# Patient Record
Sex: MALE | Race: WHITE | NOT HISPANIC OR LATINO | Employment: OTHER | URBAN - METROPOLITAN AREA
[De-identification: names, ages, dates, MRNs, and addresses within clinical notes are randomized per-mention and may not be internally consistent; named-entity substitution may affect disease eponyms.]

---

## 2017-03-28 ENCOUNTER — TELEPHONE (OUTPATIENT)
Dept: INTERNAL MEDICINE | Facility: CLINIC | Age: 52
End: 2017-03-28

## 2017-03-28 DIAGNOSIS — I82.90 CLOT: Primary | ICD-10-CM

## 2017-03-28 NOTE — TELEPHONE ENCOUNTER
elio follow up with me  Vascular medicine appt placed   Assist in scheduling both   Have him bring all his records for review

## 2017-03-28 NOTE — TELEPHONE ENCOUNTER
Spoke w pt; states that he had ultrasound of the leg this morning at an outside facility. States that the doctor did diagnose him with a blood clot. Pt was prescribed eliquis for 6 months. Pt wants to know what else should he do ?      Please advise.

## 2017-03-28 NOTE — TELEPHONE ENCOUNTER
Spoke w pt; schedule appointment for vascular medicine and will follow up w Dr. Doll after. Pt verbalized understanding.

## 2017-03-28 NOTE — TELEPHONE ENCOUNTER
----- Message from Viviana Pat sent at 3/28/2017 12:04 PM CDT -----  Contact: self  Patient states has blood clot in leg need appointment for tomorrow   Please call pt at [phone]

## 2017-03-29 ENCOUNTER — INITIAL CONSULT (OUTPATIENT)
Dept: CARDIOLOGY | Facility: CLINIC | Age: 52
End: 2017-03-29
Payer: COMMERCIAL

## 2017-03-29 VITALS
HEART RATE: 62 BPM | WEIGHT: 205.69 LBS | HEIGHT: 72 IN | SYSTOLIC BLOOD PRESSURE: 122 MMHG | BODY MASS INDEX: 27.86 KG/M2 | DIASTOLIC BLOOD PRESSURE: 80 MMHG

## 2017-03-29 DIAGNOSIS — I82.4Y9 ACUTE DEEP VEIN THROMBOSIS (DVT) OF PROXIMAL VEIN OF LOWER EXTREMITY, UNSPECIFIED LATERALITY: Primary | ICD-10-CM

## 2017-03-29 PROCEDURE — 1160F RVW MEDS BY RX/DR IN RCRD: CPT | Mod: S$GLB,,, | Performed by: INTERNAL MEDICINE

## 2017-03-29 PROCEDURE — 99999 PR PBB SHADOW E&M-EST. PATIENT-LVL III: CPT | Mod: PBBFAC,,, | Performed by: INTERNAL MEDICINE

## 2017-03-29 PROCEDURE — 99203 OFFICE O/P NEW LOW 30 MIN: CPT | Mod: S$GLB,,, | Performed by: INTERNAL MEDICINE

## 2017-03-29 NOTE — LETTER
March 29, 2017      Opal Doll MD  [address]  [facility] [address]           [facility]  [address]  [facility] [address]  Phone: [phone]          Patient: Marcio Becerra   MR Number: [id number]   YOB: 1965   Date of Visit: 3/29/2017       Dear Dr. Opal Doll:    Thank you for referring Marcio Becerra to me for evaluation. Attached you will find relevant portions of my assessment and plan of care.    If you have questions, please do not hesitate to call me. I look forward to following Marcio Becerra along with you.    Sincerely,    Sayavtar Dailey MD    Enclosure  CC:  No Recipients    If you would like to receive this communication electronically, please contact [email or web address] or [phone] to request more information on [employer] [facility] access.    For providers and/or their staff who would like to refer a patient to [facility], please contact us through our one-stop-shop provider referral line, [facility], at [phone].    If you feel you have received this communication in error or would no longer like to receive these types of communications, please e-mail [email or web address]

## 2017-03-29 NOTE — MR AVS SNAPSHOT
[facility]  [address]  [facility] [address]  Phone: [phone]                  Marcio Becerra   3/29/2017 8:00 AM   Initial consult    Description:  Male : 1965   Provider:  Aislinn Dailey MD   Department:  [facility]           Reason for Visit     Deep Vein Thrombosis           Diagnoses this Visit        Comments    Acute deep vein thrombosis (DVT) of proximal vein of lower extremity, unspecified laterality    -  Primary            To Do List           Goals (5 Years of Data)     None      Follow-Up and Disposition     Call patient with results Return in about 3 months (around 2017).       These Medications        Disp Refills Start End    apixaban 5 mg Tab 60 tablet 6 3/29/2017     Take 1 tablet (5 mg total) by mouth 2 (two) times daily. - Oral    Pharmacy: [facility]/pharmacy #[id number] - [place] - [address]  #: [phone]         [facility]     [facility] Nurse Care Line -  Assistance  Registered nurses in the [facility] provide clinical advisement, health education, appointment booking, and other advisory services.  Call for this free service at [phone].             Medications           Message regarding Medications     Verify the changes and/or additions to your medication regime listed below are the same as discussed with your clinician today.  If any of these changes or additions are incorrect, please notify your healthcare provider.        START taking these NEW medications        Refills    apixaban 5 mg Tab 6    Sig: Take 1 tablet (5 mg total) by mouth 2 (two) times daily.    Class: Normal    Route: Oral           Verify that the below list of medications is an accurate representation of the medications you are currently taking.  If none reported, the list may be blank. If incorrect, please contact your healthcare provider. Carry this list with you in case of emergency.           Current  Medications     apixaban 5 mg Tab Take 5 mg by mouth 2 (two) times daily.    apixaban 5 mg Tab Take 1 tablet (5 mg total) by mouth 2 (two) times daily.           Clinical Reference Information           Your Vitals Were     BP Pulse Height Weight BMI    122/80 (BP Location: Left arm, Patient Position: Sitting, BP Method: Manual) 62 6' (1.829 m) 93.3 kg (205 lb 11 oz) 27.9 kg/m2      Blood Pressure          Most Recent Value    BP  122/80      Allergies as of 3/29/2017     No Known Allergies      Immunizations Administered on Date of Encounter - 3/29/2017     None      Orders Placed During Today's Visit     Future Labs/Procedures Expected by Expires    Cardiology Lab US Lower Extremity Veins Right  6/29/2017 3/29/2018      [facility] Sign-Up     Activating your [facility] account is as easy as 1-2-3!     1) Visit [email or web address], select Sign Up Now, enter this activation code and your date of birth, then select Next.  [email or web address]  Expires: 5/13/2017  8:42 AM      2) Create a username and password to use when you visit [facility] in the future and select a security question in case you lose your password and select Next.    3) Enter your e-mail address and click Sign Up!    Additional Information  If you have questions, please e-mail [email or web address] or call [phone] to talk to our [facility] staff. Remember, [facility] is NOT to be used for urgent needs. For medical emergencies, dial 911.         Smoking Cessation     If you would like to quit smoking:   You may be eligible for free services if you are a [place] resident and started smoking cigarettes before September 1, 1988.  Call the Smoking Cessation Trust (SCT) toll free at (752) 363-8830 or (728) 766-5939.   Call 1-800-QUIT-NOW if you do not meet the above criteria.            Language Assistance Services     ATTENTION: Language assistance services are available, free of charge. Please call [phone].      ATENCIÓN: hiram Hughes  taylor disposición servicios gratuitos de asistencia lingüística. Jacqueline al [phone].     GUZMAN Ý: N?u b?n nói Ti?ng Vi?t, có các d?ch v? h? tr? ngôn ng? mi?n phí dành cho b?n. G?i s? [phone].         [facility] complies with applicable Federal civil rights laws and does not discriminate on the basis of race, color, national origin, age, disability, or sex.

## 2017-03-29 NOTE — PROGRESS NOTES
Subjective:    Patient ID:  Marcio Becerra is a 52 y.o. 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 Presents for evaluation of right leg pain and tenderness that' started last week. He was riding his bike for almost 1 hour, he developed right calf spasm, next day progressed to ankle swelling. Venous doppler that was done at the [facility] vein clinic showed evidence of acute popliteal DVT. The patient was prescribed apixaban yesterday, but he didn't take it. No chest pain or SOB.      Screening tests are negative  unprovoked right popliteal DVT  No family history of DVT.  Not smoker.      2012.  Report Summary:  Impression:   Left Leg: Color flow evaluation of the lower extremity demonstrates fully compressible and patent veins. There is no evidence of venous thrombosis in the deep or superficial veins. Significant reflux is noted in the greater saphenous vein.  Past Medical History:   Diagnosis Date    DJD (degenerative joint disease) of cervical spine     hx of     Fatty liver     Fever blister        indicated that the status of his mother is unknown. He indicated that the status of his father is unknown. He indicated that the status of his neg hx is unknown.     Social History     Social History    Marital status:      Spouse name: N/A    Number of children: N/A    Years of education: N/A     Occupational History    Not on file.     Social History Main Topics    Smoking status: Current Every Day Smoker     Types: Cigars    Smokeless tobacco: Current User    Alcohol use Yes      Comment: 3-4 beers daily     Drug use: No    Sexual activity: Not on file     Other Topics Concern    Not on file     Social History Narrative       Current Outpatient Prescriptions   Medication Sig    apixaban 5 mg Tab Take 5 mg by mouth 2 (two) times daily.    apixaban 5 mg Tab Take 1 tablet (5 mg total) by mouth 2  (two) times daily.     No current facility-administered medications for this visit.        CMP  Sodium   Date Value Ref Range Status   10/14/2016 142 136 - 145 mmol/L Final     Potassium   Date Value Ref Range Status   10/14/2016 4.3 3.5 - 5.1 mmol/L Final     Chloride   Date Value Ref Range Status   10/14/2016 106 95 - 110 mmol/L Final     CO2   Date Value Ref Range Status   10/14/2016 30 (H) 23 - 29 mmol/L Final     Glucose   Date Value Ref Range Status   10/14/2016 89 70 - 110 mg/dL Final     BUN, Bld   Date Value Ref Range Status   10/14/2016 9 6 - 20 mg/dL Final     Creatinine   Date Value Ref Range Status   10/14/2016 0.9 0.5 - 1.4 mg/dL Final     Calcium   Date Value Ref Range Status   10/14/2016 9.1 8.7 - 10.5 mg/dL Final     Total Protein   Date Value Ref Range Status   11/23/2016 7.3 6.0 - 8.4 g/dL Final     Albumin   Date Value Ref Range Status   11/23/2016 3.7 3.5 - 5.2 g/dL Final     Total Bilirubin   Date Value Ref Range Status   11/23/2016 0.8 0.1 - 1.0 mg/dL Final     Comment:     For infants and newborns, interpretation of results should be based  on gestational age, weight and in agreement with clinical  observations.  Premature Infant recommended reference ranges:  Up to 24 hours.............<8.0 mg/dL  Up to 48 hours............<12.0 mg/dL  3-5 days..................<15.0 mg/dL  6-29 days.................<15.0 mg/dL       Alkaline Phosphatase   Date Value Ref Range Status   11/23/2016 40 (L) 55 - 135 U/L Final     AST   Date Value Ref Range Status   11/23/2016 26 10 - 40 U/L Final     ALT   Date Value Ref Range Status   11/23/2016 29 10 - 44 U/L Final     Anion Gap   Date Value Ref Range Status   10/14/2016 6 (L) 8 - 16 mmol/L Final     eGFR if    Date Value Ref Range Status   10/14/2016 >60.0 >60 mL/min/1.73 m^2 Final     eGFR if non    Date Value Ref Range Status   10/14/2016 >60.0 >60 mL/min/1.73 m^2 Final     Comment:     Calculation used to obtain the  estimated glomerular filtration  rate (eGFR) is the CKD-EPI equation. Since race is unknown   in our information system, the eGFR values for   -American and Non--American patients are given   for each creatinine result.         Lab Results   Component Value Date    WBC 4.05 10/14/2016    HGB 16.7 10/14/2016    HCT 46.8 10/14/2016    MCV 95 10/14/2016     10/14/2016       Review of Systems   Constitution: Negative for decreased appetite, fever and weight gain.   HENT: Negative.    Cardiovascular: Positive for leg swelling. Negative for chest pain, claudication and cyanosis.   Respiratory: Negative for cough, shortness of breath and wheezing.    Skin: Negative for color change, dry skin, itching, rash and suspicious lesions.   Musculoskeletal: Positive for myalgias. Negative for arthritis, back pain, joint swelling and muscle weakness.   Gastrointestinal: Negative.    Genitourinary: Negative.    Neurological: Negative.  Negative for loss of balance, numbness and paresthesias.        Objective:   /80 (BP Location: Left arm, Patient Position: Sitting, BP Method: Manual)  Pulse 62  Ht 6' (1.829 m)  Wt 93.3 kg (205 lb 11 oz)  BMI 27.9 kg/m2  Physical Exam   Constitutional: He is oriented to person, place, and time. He appears well-developed and well-nourished. No distress.   HENT:   Head: Normocephalic and atraumatic.   Eyes: Conjunctivae and EOM are normal. Pupils are equal, round, and reactive to light.   Neck: Normal range of motion. Neck supple. No JVD present.   Cardiovascular: Normal rate, regular rhythm, normal heart sounds and intact distal pulses.  Exam reveals no gallop and no friction rub.    No murmur heard.  Pulmonary/Chest: Effort normal and breath sounds normal. No respiratory distress. He has no wheezes.   Musculoskeletal: Normal range of motion. He exhibits tenderness. He exhibits no edema.   Neurological: He is alert and oriented to person, place, and time.   Skin: Skin is  warm. No rash noted. He is not diaphoretic. No erythema. No pallor.           Assessment:       1. Acute deep vein thrombosis (DVT) of proximal vein of lower extremity, unspecified laterality  apixaban 5 mg Tab    Cardiology Lab US Lower Extremity Veins Right      A biker who had unprovoked right popliteal vein DVT.  Plan:       Diagnoses and all orders for this visit:    1. Continue Apixaban for 3- 6 months.  2. Venous doppler right in 3 months  3. D-dimer and clotting factors work up  In 3-6 months.  4. Compression stocking.  5. Follow up in 3 months.  6. Will get his Venous doppler report from [facility].    Aislinn Dailey

## 2017-03-31 ENCOUNTER — TELEPHONE (OUTPATIENT)
Dept: CARDIOLOGY | Facility: CLINIC | Age: 52
End: 2017-03-31

## 2017-03-31 NOTE — TELEPHONE ENCOUNTER
Per Dr julien spoke with patient in regards to medication advice .    Patient verbalized understanding.

## 2017-03-31 NOTE — TELEPHONE ENCOUNTER
----- Message from Fany Gonzalez sent at 3/30/2017  3:38 PM CDT -----  Contact: self   Pt has requested a call to discuss his current medication. Pt call back number is [phone].        Thanks

## 2017-04-24 ENCOUNTER — OFFICE VISIT (OUTPATIENT)
Dept: INTERNAL MEDICINE | Facility: CLINIC | Age: 52
End: 2017-04-24
Payer: COMMERCIAL

## 2017-04-24 VITALS
OXYGEN SATURATION: 94 % | SYSTOLIC BLOOD PRESSURE: 122 MMHG | DIASTOLIC BLOOD PRESSURE: 78 MMHG | TEMPERATURE: 99 F | HEART RATE: 73 BPM | HEIGHT: 72 IN | BODY MASS INDEX: 26.76 KG/M2 | RESPIRATION RATE: 16 BRPM | WEIGHT: 197.56 LBS

## 2017-04-24 DIAGNOSIS — J40 BRONCHITIS: Primary | ICD-10-CM

## 2017-04-24 PROCEDURE — 99999 PR PBB SHADOW E&M-EST. PATIENT-LVL IV: CPT | Mod: PBBFAC,,, | Performed by: NURSE PRACTITIONER

## 2017-04-24 PROCEDURE — 1160F RVW MEDS BY RX/DR IN RCRD: CPT | Mod: S$GLB,,, | Performed by: NURSE PRACTITIONER

## 2017-04-24 PROCEDURE — 99213 OFFICE O/P EST LOW 20 MIN: CPT | Mod: S$GLB,,, | Performed by: NURSE PRACTITIONER

## 2017-04-24 RX ORDER — FLUTICASONE PROPIONATE 50 MCG
1 SPRAY, SUSPENSION (ML) NASAL DAILY
Qty: 16 G | Refills: 0
Start: 2017-04-24

## 2017-04-24 RX ORDER — ALBUTEROL SULFATE 90 UG/1
2 AEROSOL, METERED RESPIRATORY (INHALATION) EVERY 4 HOURS PRN
Qty: 1 INHALER | Refills: 0 | Status: SHIPPED | OUTPATIENT
Start: 2017-04-24

## 2017-04-24 RX ORDER — BENZONATATE 200 MG/1
200 CAPSULE ORAL 3 TIMES DAILY PRN
Qty: 30 CAPSULE | Refills: 0 | Status: SHIPPED | OUTPATIENT
Start: 2017-04-24 | End: 2017-05-04

## 2017-04-24 NOTE — MR AVS SNAPSHOT
[facility] - Internal Medicine  [address]  [facility] [address]  Phone: [phone]  Fax: [phone]                  Marcio Becerra   2017 3:30 PM   Office Visit    Description:  Male : 1965   Provider:  Jayshree Cadena NP   Department:  [facility]           Reason for Visit     Cough     Shortness of Breath           Diagnoses this Visit        Comments    Bronchitis    -  Primary            To Do List           Future Appointments        Provider Department Dept Phone    2017 10:00 AM VASCULAR, CARDIOLOGY [facility] - Vascular Cardiology [phone]      Goals (5 Years of Data)     None       These Medications        Disp Refills Start End    fluticasone (FLONASE) 50 mcg/actuation nasal spray 16 g 0 2017     1 spray by Each Nare route once daily. - Each Nare    Pharmacy: [facility]/pharmacy #[id number] - [place] - [address]. Ph #: [phone]       albuterol 90 mcg/actuation inhaler 1 Inhaler 0 2017     Inhale 2 puffs into the lungs every 4 (four) hours as needed for Wheezing. Or bronchospasm - Inhalation    Pharmacy: [facility]/pharmacy #[id number] - [place] - [address] Ph #: [phone]       benzonatate (TESSALON) 200 MG capsule 30 capsule 0 2017    Take 1 capsule (200 mg total) by mouth 3 (three) times daily as needed for Cough. - Oral    Pharmacy: [facility]/pharmacy #[id number] - [place] - [address] Ph #: [phone]         [facility] On Call     [facility] On [facility] Nurse Care Line -  Assistance  Unless otherwise directed by your provider, please contact [facility], our nurse care line that is available for  assistance.     Registered nurses in the [facility] provide: appointment scheduling, clinical advisement, health education, and other advisory services.  Call: [phone] (toll free)               Medications           Message regarding Medications     Verify the changes and/or additions to your  medication regime listed below are the same as discussed with your clinician today.  If any of these changes or additions are incorrect, please notify your healthcare provider.        START taking these NEW medications        Refills    fluticasone (FLONASE) 50 mcg/actuation nasal spray 0    Si spray by Each Nare route once daily.    Class: No Print    Route: Each Nare    albuterol 90 mcg/actuation inhaler 0    Sig: Inhale 2 puffs into the lungs every 4 (four) hours as needed for Wheezing. Or bronchospasm    Class: Normal    Route: Inhalation    benzonatate (TESSALON) 200 MG capsule 0    Sig: Take 1 capsule (200 mg total) by mouth 3 (three) times daily as needed for Cough.    Class: Normal    Route: Oral           Verify that the below list of medications is an accurate representation of the medications you are currently taking.  If none reported, the list may be blank. If incorrect, please contact your healthcare provider. Carry this list with you in case of emergency.           Current Medications     apixaban 5 mg Tab Take 1 tablet (5 mg total) by mouth 2 (two) times daily.    albuterol 90 mcg/actuation inhaler Inhale 2 puffs into the lungs every 4 (four) hours as needed for Wheezing. Or bronchospasm    apixaban 5 mg Tab Take 5 mg by mouth 2 (two) times daily.    benzonatate (TESSALON) 200 MG capsule Take 1 capsule (200 mg total) by mouth 3 (three) times daily as needed for Cough.    fluticasone (FLONASE) 50 mcg/actuation nasal spray 1 spray by Each Nare route once daily.           Clinical Reference Information           Your Vitals Were     BP Pulse Temp Resp Height Weight    122/78 (BP Location: Left arm, Patient Position: Sitting) 73 98.7 °F (37.1 °C) (Oral) 16 6' (1.829 m) 89.6 kg (197 lb 8.5 oz)    SpO2 BMI             94% 26.79 kg/m2         Blood Pressure          Most Recent Value    BP  122/78      Allergies as of 2017     No Known Allergies      Immunizations Administered on Date of Encounter  - 4/24/2017     None      [facility] Sign-Up     Activating your [facility] account is as easy as 1-2-3!     1) Visit [email or web address], select Sign Up Now, enter this activation code and your date of birth, then select Next.  [email or web address]  Expires: 5/13/2017  8:42 AM      2) Create a username and password to use when you visit [facility] in the future and select a security question in case you lose your password and select Next.    3) Enter your e-mail address and click Sign Up!    Additional Information  If you have questions, please e-mail [email or web address] or call [phone] to talk to our [facility] staff. Remember, [facility] is NOT to be used for urgent needs. For medical emergencies, dial 911.         Instructions    - Warm, salt water gargles three times daily as needed.   - Delsym 12 cough suppressant       Smoking Cessation     If you would like to quit smoking:   You may be eligible for free services if you are a [place] resident and started smoking cigarettes before September 1, 1988.  Call the Smoking Cessation Trust ([facility]) toll free at (307) 800-0415 or (421) 540-4272.   Call 5-800-QUIT-NOW if you do not meet the above criteria.   Contact us via email: [email or web address]   View our website for more information: [email or web address]        Language Assistance Services     ATTENTION: Language assistance services are available, free of charge. Please call [phone].      ATENCIÓN: Si habla español, tiene a taylor disposición servicios gratuitos de asistencia lingüística. Llame al [phone].     CHÚ Ý: N?u b?n nói Ti?ng Vi?t, có các d?ch v? h? tr? ngôn ng? mi?n phí dành cho b?n. G?i s? [phone].         [facility] complies with applicable Federal civil rights laws and does not discriminate on the basis of race, color, national origin, age, disability, or sex.

## 2017-04-24 NOTE — PROGRESS NOTES
"Subjective:       Patient ID: Marcio Becerra is a 52 y.o. male.    Chief Complaint: Cough and Shortness of Breath  Mr Becerra presents today for a cough.  He has had it since early March.  He went to a non[facility] urgent care and got a shot, likely steroid, and felt better for about 2 weeks.  He then began coughing again and it has not improved.   Cough   This is a new problem. The current episode started more than 1 month ago. The problem has been waxing and waning. The problem occurs every few minutes. The cough is productive of sputum. Associated symptoms include headaches, postnasal drip, rhinorrhea and shortness of breath. Pertinent negatives include no chest pain, chills, ear congestion, ear pain, fever, heartburn, hemoptysis, myalgias, nasal congestion, rash, sore throat, sweats, weight loss or wheezing. Nothing aggravates the symptoms. Treatments tried: "shot" The treatment provided moderate relief.     Review of Systems   Constitutional: Positive for fatigue. Negative for chills, diaphoresis, fever and weight loss.   HENT: Positive for postnasal drip and rhinorrhea. Negative for ear pain, facial swelling, sore throat and trouble swallowing.    Eyes: Negative for visual disturbance.   Respiratory: Positive for cough and shortness of breath. Negative for hemoptysis and wheezing.    Cardiovascular: Negative for chest pain.   Gastrointestinal: Negative for diarrhea, heartburn, nausea and vomiting.   Genitourinary: Negative for dysuria.   Musculoskeletal: Negative for myalgias.   Skin: Negative for rash.   Neurological: Positive for headaches.   Psychiatric/Behavioral: Negative for confusion.       Objective:      Physical Exam   Constitutional: He is oriented to person, place, and time. He appears well-developed and well-nourished. No distress.   HENT:   Head: Normocephalic and atraumatic.   Right Ear: Tympanic membrane and ear canal normal.   Left Ear: Tympanic membrane and ear canal normal.   Nose: No mucosal " edema, rhinorrhea or sinus tenderness. Right sinus exhibits no maxillary sinus tenderness and no frontal sinus tenderness. Left sinus exhibits no maxillary sinus tenderness and no frontal sinus tenderness.   Mouth/Throat: Uvula swelling present. Oropharyngeal exudate, posterior oropharyngeal edema and posterior oropharyngeal erythema present. No tonsillar abscesses. Tonsils are 0 on the right. Tonsils are 0 on the left. No tonsillar exudate.   Eyes: No scleral icterus.   Neck: Normal range of motion. Neck supple.   Cardiovascular: Normal rate, regular rhythm and normal heart sounds.  Exam reveals no gallop and no friction rub.    No murmur heard.  Pulmonary/Chest: Effort normal and breath sounds normal. No respiratory distress. He has no wheezes. He has no rales.   Abdominal: Bowel sounds are normal.   Musculoskeletal: Normal range of motion.   Lymphadenopathy:     He has no cervical adenopathy.   Neurological: He is oriented to person, place, and time.   Skin: Skin is warm and dry. He is not diaphoretic.   Psychiatric: He has a normal mood and affect. His behavior is normal.   Vitals reviewed.      Assessment:       1. Bronchitis        Plan:   1. Bronchitis  - fluticasone (FLONASE) 50 mcg/actuation nasal spray; 1 spray by Each Nare route once daily.  Dispense: 16 g; Refill: 0  - albuterol 90 mcg/actuation inhaler; Inhale 2 puffs into the lungs every 4 (four) hours as needed for Wheezing. Or bronchospasm  Dispense: 1 Inhaler; Refill: 0  - benzonatate (TESSALON) 200 MG capsule; Take 1 capsule (200 mg total) by mouth 3 (three) times daily as needed for Cough.  Dispense: 30 capsule; Refill: 0      Pt has been given instructions populated from [employer] database and has verbalized understanding of the after visit summary and information contained wherein.    Follow up with a primary care provider. May go to ER for acute shortness of breath, lightheadedness, fever, or any other emergent complaints or changes in  condition.

## 2017-05-03 ENCOUNTER — HOSPITAL ENCOUNTER (OUTPATIENT)
Dept: RADIOLOGY | Facility: HOSPITAL | Age: 52
Discharge: HOME | End: 2017-05-03
Attending: INTERNAL MEDICINE
Payer: COMMERCIAL

## 2017-05-03 ENCOUNTER — NURSE TRIAGE (OUTPATIENT)
Dept: ADMINISTRATIVE | Facility: CLINIC | Age: 52
End: 2017-05-03

## 2017-05-03 ENCOUNTER — OFFICE VISIT (OUTPATIENT)
Dept: INTERNAL MEDICINE | Facility: CLINIC | Age: 52
End: 2017-05-03
Payer: COMMERCIAL

## 2017-05-03 VITALS
HEIGHT: 72 IN | SYSTOLIC BLOOD PRESSURE: 110 MMHG | WEIGHT: 202.19 LBS | OXYGEN SATURATION: 99 % | BODY MASS INDEX: 27.39 KG/M2 | DIASTOLIC BLOOD PRESSURE: 70 MMHG | HEART RATE: 71 BPM

## 2017-05-03 DIAGNOSIS — R53.83 FATIGUE, UNSPECIFIED TYPE: ICD-10-CM

## 2017-05-03 DIAGNOSIS — I82.4Y1 ACUTE DEEP VEIN THROMBOSIS (DVT) OF PROXIMAL VEIN OF RIGHT LOWER EXTREMITY: ICD-10-CM

## 2017-05-03 DIAGNOSIS — Z79.01 ANTICOAGULATED BY ANTICOAGULATION TREATMENT: ICD-10-CM

## 2017-05-03 DIAGNOSIS — M10.9 ACUTE GOUT INVOLVING TOE OF LEFT FOOT, UNSPECIFIED CAUSE: Primary | ICD-10-CM

## 2017-05-03 PROCEDURE — 1160F RVW MEDS BY RX/DR IN RCRD: CPT | Mod: S$GLB,,, | Performed by: INTERNAL MEDICINE

## 2017-05-03 PROCEDURE — 93005 ELECTROCARDIOGRAM TRACING: CPT | Mod: S$GLB,,, | Performed by: INTERNAL MEDICINE

## 2017-05-03 PROCEDURE — 93010 ELECTROCARDIOGRAM REPORT: CPT | Mod: S$GLB,,, | Performed by: INTERNAL MEDICINE

## 2017-05-03 PROCEDURE — 99999 PR PBB SHADOW E&M-EST. PATIENT-LVL III: CPT | Mod: 25,PBBFAC,, | Performed by: INTERNAL MEDICINE

## 2017-05-03 PROCEDURE — 99214 OFFICE O/P EST MOD 30 MIN: CPT | Mod: S$GLB,,, | Performed by: INTERNAL MEDICINE

## 2017-05-03 PROCEDURE — 71020 XR CHEST PA AND LATERAL: CPT | Mod: 26,,, | Performed by: RADIOLOGY

## 2017-05-03 PROCEDURE — 71020 XR CHEST PA AND LATERAL: CPT | Mod: TC

## 2017-05-03 RX ORDER — COLCHICINE 0.6 MG/1
TABLET ORAL
Qty: 30 TABLET | Refills: 0 | Status: SHIPPED | OUTPATIENT
Start: 2017-05-03 | End: 2017-06-07 | Stop reason: SDUPTHER

## 2017-05-03 NOTE — TELEPHONE ENCOUNTER
"  Reason for Disposition   SEVERE pain (e.g., excruciating, unable to do any normal activities) and not improved after 2 hours of pain medicine   Caller has NON-URGENT medication question about med that PCP prescribed and triager unable to answer question    Answer Assessment - Initial Assessment Questions  1. SYMPTOMS: "Do you have any symptoms?"      Fatigue, constipation, intermittent abdominal pain, severe joint pain left toe.  2. SEVERITY: If symptoms are present, ask "Are they mild, moderate or severe?"      Moderate    Protocols used: ST TOE PAIN-A-OH,  MEDICATION QUESTION CALL-A-  Mr. Becerra states he has been experiencing fatigue, intermittent abdominal pain, constipation, and severe left big toe pain. Patient believes symptoms are related to the use of Eliquis. Advised patient to be seen in office today per protocol to discuss symptoms and to have toe assessed.  "
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
## 2017-05-03 NOTE — MR AVS SNAPSHOT
[facility] Medicine  [address]  [facility] [address]  Phone: [phone]  Fax: [phone]                  Mracio Becerra   5/3/2017 1:20 PM   Office Visit    Description:  Male : 1965   Provider:  Shari Peralta MD   Department:  [facility] Medicine           Reason for Visit     Fatigue     Toe Pain     Abdominal Pain     Spasms           Diagnoses this Visit        Comments    Acute gout involving toe of left foot, unspecified cause    -  Primary     Acute deep vein thrombosis (DVT) of proximal vein of right lower extremity         Fatigue, unspecified type         Anticoagulated by anticoagulation treatment                To Do List           Future Appointments        Provider Department Dept Phone    5/3/2017 3:05 PM LAB, APPOINTMENT [facility] INTMED [facility] [phone]    5/3/2017 3:30 PM [facility] [id number] LB LIMIT [facility] [phone]    2017 10:00 AM VASCULAR, CARDIOLOGY [facility] - Vascular Cardiology [phone]      Goals (5 Years of Data)     None      Follow-Up and Disposition     Return for needs appointment with Dr. Doll.       These Medications        Disp Refills Start End    colchicine 0.6 mg tablet 30 tablet 0 5/3/2017     Take two tablets at first and may repeat one additional tablet if there is no relief in one hour today.  Then, tomorrow take one tablet once or twice daily until all of the pain and swelling has resolved.    Pharmacy: [facility] [address] Ph #: [phone]         [facility]     [facility] Nurse Care Line -  Assistance  Unless otherwise directed by your provider, please contact [facility], our nurse care line that is available for  assistance.     Registered nurses in the [facility] provide: appointment scheduling, clinical advisement, health education, and other advisory services.  Call:  [phone] (toll free)               Medications           Message regarding Medications     Verify the changes and/or additions to your medication regime listed below are the same as discussed with your clinician today.  If any of these changes or additions are incorrect, please notify your healthcare provider.        START taking these NEW medications        Refills    colchicine 0.6 mg tablet 0    Sig: Take two tablets at first and may repeat one additional tablet if there is no relief in one hour today.  Then, tomorrow take one tablet once or twice daily until all of the pain and swelling has resolved.    Class: Print           Verify that the below list of medications is an accurate representation of the medications you are currently taking.  If none reported, the list may be blank. If incorrect, please contact your healthcare provider. Carry this list with you in case of emergency.           Current Medications     albuterol 90 mcg/actuation inhaler Inhale 2 puffs into the lungs every 4 (four) hours as needed for Wheezing. Or bronchospasm    apixaban 5 mg Tab Take 1 tablet (5 mg total) by mouth 2 (two) times daily.    benzonatate (TESSALON) 200 MG capsule Take 1 capsule (200 mg total) by mouth 3 (three) times daily as needed for Cough.    colchicine 0.6 mg tablet Take two tablets at first and may repeat one additional tablet if there is no relief in one hour today.  Then, tomorrow take one tablet once or twice daily until all of the pain and swelling has resolved.    fluticasone (FLONASE) 50 mcg/actuation nasal spray 1 spray by Each Nare route once daily.           Clinical Reference Information           Your Vitals Were     BP Pulse Height Weight SpO2 BMI    110/70 71 6' (1.829 m) 91.7 kg (202 lb 2.6 oz) 99% 27.42 kg/m2      Blood Pressure          Most Recent Value    BP  110/70      Allergies as of 5/3/2017     No Known Allergies      Immunizations Administered on Date of Encounter - 5/3/2017     None       Orders Placed During Today's Visit     Future Labs/Procedures Expected by Expires    CBC auto differential  5/3/2017 5/3/2018    Comprehensive metabolic panel  5/3/2017 7/2/2018    Uric acid  5/3/2017 7/2/2018    X-Ray Chest PA And Lateral  5/3/2017 5/3/2018    EKG 12-lead  As directed 5/3/2018      [facility] Sign-Up     Activating your [facility] account is as easy as 1-2-3!     1) Visit [email or web address], select Sign Up Now, enter this activation code and your date of birth, then select Next.  [email or web address]  Expires: 5/13/2017  8:42 AM      2) Create a username and password to use when you visit [facility] in the future and select a security question in case you lose your password and select Next.    3) Enter your e-mail address and click Sign Up!    Additional Information  If you have questions, please e-mail [email or web address] or call [phone] to talk to our [facility] staff. Remember, [facility] is NOT to be used for urgent needs. For medical emergencies, dial 911.         Smoking Cessation     If you would like to quit smoking:   You may be eligible for free services if you are a [place] resident and started smoking cigarettes before September 1, 1988.  Call the Smoking Cessation Trust ([facility]) toll free at ([phone] or [phone].   Call 1-800-QUIT-NOW if you do not meet the above criteria.   Contact us via email: [email or web address]   View our website for more information: [email or web address]        Language Assistance Services     ATTENTION: Language assistance services are available, free of charge. Please call [phone].      ATENCIÓN: Si habla español, tiene a taylor disposición servicios gratuitos de asistencia lingüística. Llame al [phone].     CHÚ Ý: N?u b?n nói Ti?ng Vi?t, có các d?ch v? h? tr? ngôn ng? mi?n phí dành cho b?n. G?i s? [phone].         [facility] complies with applicable Federal civil rights laws and does not discriminate  on the basis of race, color, national origin, age, disability, or sex.

## 2017-05-04 ENCOUNTER — TELEPHONE (OUTPATIENT)
Dept: INTERNAL MEDICINE | Facility: CLINIC | Age: 52
End: 2017-05-04

## 2017-05-04 ENCOUNTER — HOSPITAL ENCOUNTER (EMERGENCY)
Facility: HOSPITAL | Age: 52
Discharge: HOME | End: 2017-05-04
Attending: EMERGENCY MEDICINE
Payer: COMMERCIAL

## 2017-05-04 VITALS
OXYGEN SATURATION: 100 % | WEIGHT: 200 LBS | BODY MASS INDEX: 27.09 KG/M2 | HEART RATE: 64 BPM | HEIGHT: 72 IN | DIASTOLIC BLOOD PRESSURE: 106 MMHG | SYSTOLIC BLOOD PRESSURE: 150 MMHG | RESPIRATION RATE: 11 BRPM | TEMPERATURE: 99 F

## 2017-05-04 DIAGNOSIS — R06.02 SHORTNESS OF BREATH: ICD-10-CM

## 2017-05-04 DIAGNOSIS — I26.99 OTHER PULMONARY EMBOLISM WITHOUT ACUTE COR PULMONALE, UNSPECIFIED CHRONICITY: Primary | ICD-10-CM

## 2017-05-04 LAB
ALBUMIN SERPL BCP-MCNC: 4 G/DL
ALP SERPL-CCNC: 50 U/L
ALT SERPL W/O P-5'-P-CCNC: 162 U/L
AST SERPL-CCNC: 99 U/L
BASOPHILS # BLD AUTO: 0.01 K/UL
BASOPHILS NFR BLD: 0.2 %
BILIRUB DIRECT SERPL-MCNC: 0.5 MG/DL
BILIRUB SERPL-MCNC: 1.1 MG/DL
BNP SERPL-MCNC: 15 PG/ML
BUN SERPL-MCNC: 11 MG/DL (ref 6–30)
CHLORIDE SERPL-SCNC: 97 MMOL/L (ref 95–110)
CREAT SERPL-MCNC: 0.7 MG/DL (ref 0.5–1.4)
DIFFERENTIAL METHOD: ABNORMAL
EOSINOPHIL # BLD AUTO: 0.1 K/UL
EOSINOPHIL NFR BLD: 2 %
ERYTHROCYTE [DISTWIDTH] IN BLOOD BY AUTOMATED COUNT: 12.6 %
GLUCOSE SERPL-MCNC: 95 MG/DL (ref 70–110)
HCT VFR BLD AUTO: 46.1 %
HCT VFR BLD CALC: 49 %PCV (ref 36–54)
HGB BLD-MCNC: 16.4 G/DL
LYMPHOCYTES # BLD AUTO: 1.2 K/UL
LYMPHOCYTES NFR BLD: 24.8 %
MCH RBC QN AUTO: 34 PG
MCHC RBC AUTO-ENTMCNC: 35.6 %
MCV RBC AUTO: 95 FL
MONOCYTES # BLD AUTO: 0.3 K/UL
MONOCYTES NFR BLD: 6.6 %
NEUTROPHILS # BLD AUTO: 3.3 K/UL
NEUTROPHILS NFR BLD: 66.2 %
PLATELET # BLD AUTO: 184 K/UL
PMV BLD AUTO: 9.9 FL
POC IONIZED CALCIUM: 1.17 MMOL/L (ref 1.06–1.42)
POC TCO2 (MEASURED): 28 MMOL/L (ref 23–29)
POTASSIUM BLD-SCNC: 3.7 MMOL/L (ref 3.5–5.1)
PROT SERPL-MCNC: 8.2 G/DL
RBC # BLD AUTO: 4.83 M/UL
SAMPLE: NORMAL
SODIUM BLD-SCNC: 140 MMOL/L (ref 136–145)
TROPONIN I SERPL DL<=0.01 NG/ML-MCNC: <0.006 NG/ML
WBC # BLD AUTO: 5 K/UL

## 2017-05-04 PROCEDURE — 85025 COMPLETE CBC W/AUTO DIFF WBC: CPT

## 2017-05-04 PROCEDURE — 84484 ASSAY OF TROPONIN QUANT: CPT

## 2017-05-04 PROCEDURE — 99284 EMERGENCY DEPT VISIT MOD MDM: CPT | Mod: ,,, | Performed by: EMERGENCY MEDICINE

## 2017-05-04 PROCEDURE — 25500020 PHARM REV CODE 255: Performed by: EMERGENCY MEDICINE

## 2017-05-04 PROCEDURE — 93010 ELECTROCARDIOGRAM REPORT: CPT | Mod: ,,, | Performed by: INTERNAL MEDICINE

## 2017-05-04 PROCEDURE — 99284 EMERGENCY DEPT VISIT MOD MDM: CPT | Mod: 25

## 2017-05-04 PROCEDURE — 83880 ASSAY OF NATRIURETIC PEPTIDE: CPT

## 2017-05-04 PROCEDURE — 80076 HEPATIC FUNCTION PANEL: CPT

## 2017-05-04 PROCEDURE — 93005 ELECTROCARDIOGRAM TRACING: CPT

## 2017-05-04 RX ADMIN — IOHEXOL 75 ML: 350 INJECTION, SOLUTION INTRAVENOUS at 06:05

## 2017-05-04 NOTE — ED PROVIDER NOTES
Encounter Date: 5/4/2017       History     Chief Complaint   Patient presents with    Shortness of Breath     Takes Eloquist for DVT right leg.      Review of patient's allergies indicates:  No Known Allergies  HPI Comments: 52 y.o. male with a PMH of DVT in R leg (on Eliquis5 mg) and gout who presents to the ER complaining of cough and chest pain. Patient reports a non-productive cough that occurs mostly with deep breaths and with exertion. Pt was recently diagnosed with a DVT and was placed on Eliquis. Patient reports cough and R CP started in March around the same time that he had R calf pain, but only had DVT study not any chest imaging.  He went to a provider for this on 4/24. He was started on albuterol inhaler, flonase, and tessalon capsules. Symptoms improved slightly especially with the inhaler. The cough did become productive with clear sputum. On deep breaths, he reports tightness in his RIGHT chest especially. Pain does not radiate.  Patient contacted his PCP today who was concerned for PE and advised the patient to go to the ER for further evaluation.  Patient also reports fatigue and constipation.  Upon clarification, the patient denies any difficulty breathing or shortness of breath, he is able to do all activities without issue.  Patient denies n/v, headaches, syncope, presyncope, diaphoresis, abdominal pain, hematuria, fever, chills, melena, and calf pain.    The history is provided by the patient.     Past Medical History:   Diagnosis Date    DJD (degenerative joint disease) of cervical spine     hx of     Fatty liver     Fever blister     Gout 05/2017    left big toe     Past Surgical History:   Procedure Laterality Date    acl repair left knee      acl replacement left knee       COLONOSCOPY N/A 11/1/2016    Procedure: COLONOSCOPY;  Surgeon: YFN Godoy MD;  Location: [facility] ([address] [place] [address]);  Service: Endoscopy;  Laterality: N/A;    FINGER CONTRACTURE RELEASE Left 2016    meniscal  tear bilateral       Family History   Problem Relation Age of Onset    Stroke Mother     Cancer Mother      knee     Atrial fibrillation Mother     Hypertension Father     Cancer Father      bladder cancer     Glaucoma Brother     Hyperlipidemia Brother     Heart disease Brother      60s     Drug abuse Brother      opioid    Melanoma Neg Hx      Social History   Substance Use Topics    Smoking status: Light Tobacco Smoker     Types: Cigars    Smokeless tobacco: Current User    Alcohol use Yes      Comment: 2-3 beers daily      Review of Systems   Constitutional: Positive for fatigue. Negative for activity change, chills, diaphoresis and fever.   HENT: Negative for congestion, sinus pressure and sore throat.    Eyes: Negative for visual disturbance.   Respiratory: Positive for cough (especially when taking deep breaths). Negative for choking, chest tightness, shortness of breath, wheezing and stridor.    Cardiovascular: Positive for chest pain (right sided when taking deep breaths). Negative for palpitations and leg swelling.   Gastrointestinal: Positive for constipation. Negative for abdominal pain, blood in stool, diarrhea, nausea and vomiting.   Genitourinary: Negative for difficulty urinating, dysuria, flank pain, frequency, hematuria and urgency.   Musculoskeletal: Negative for arthralgias, gait problem, myalgias and neck pain.   Skin: Positive for color change (left great toe red (recent dx of gout)).   Neurological: Negative for dizziness, tremors, syncope, speech difficulty, weakness, light-headedness and headaches.   Psychiatric/Behavioral: Negative for confusion.       Physical Exam   Initial Vitals   BP Pulse Resp Temp SpO2   05/04/17 1508 05/04/17 1508 05/04/17 1508 05/04/17 1508 05/04/17 1508   175/103 62 18 98.7 °F (37.1 °C) 98 %     Physical Exam    Nursing note and vitals reviewed.  Constitutional: He appears well-developed and well-nourished. He is not diaphoretic. No distress.   HENT:    Head: Normocephalic and atraumatic.   Eyes: EOM are normal. Pupils are equal, round, and reactive to light. No scleral icterus.   Neck: Normal range of motion. Neck supple. No thyromegaly present.   Cardiovascular: Normal rate, regular rhythm, S1 normal, S2 normal, normal heart sounds, intact distal pulses and normal pulses. Exam reveals no gallop and no friction rub.    No murmur heard.  Pulses:       Dorsalis pedis pulses are 2+ on the right side, and 2+ on the left side.        Posterior tibial pulses are 2+ on the right side, and 2+ on the left side.   Pulmonary/Chest: Breath sounds normal. No respiratory distress. He has no wheezes. He has no rhonchi. He has no rales. He exhibits no tenderness.   Abdominal: Soft. Bowel sounds are normal. He exhibits no distension. There is no tenderness. There is no rebound and no guarding.   Musculoskeletal: Normal range of motion. He exhibits no edema or tenderness.   The right calf is moderately swollen compared to the left.  There is no significant tenderness to palpation.  There is no erythema or other skin color change.   Lymphadenopathy:     He has no cervical adenopathy.   Neurological: He is alert and oriented to person, place, and time.   Skin: Skin is warm, dry and intact. No rash noted.   Psychiatric: He has a normal mood and affect. His behavior is normal. Judgment and thought content normal.         ED Course   Procedures  Labs Reviewed   CBC W/ AUTO DIFFERENTIAL - Abnormal; Notable for the following:        Result Value    MCH 34.0 (*)     All other components within normal limits   HEPATIC FUNCTION PANEL - Abnormal; Notable for the following:     Total Bilirubin 1.1 (*)     Bilirubin, Direct 0.5 (*)     AST 99 (*)      (*)     Alkaline Phosphatase 50 (*)     All other components within normal limits   TROPONIN I   B-TYPE NATRIURETIC PEPTIDE   ISTAT PROCEDURE             Medical Decision Making:   History:   Old Medical Records: I decided to obtain old  medical records.  Initial Assessment:       MDM:  52-year-old female with a recent diagnosis of DVT in March presents for evaluation of fatigue, cough and chest pain associated with exertion. He had these symptoms prior to DVT diagnosis, but never worked up for PE yet and sent by PCP for it. Some improvement in cough with Albuterol started 2 weeks ago. No hypoxia/tachy, no SOB at rest and no SIMPSON or limitation in heavy exertion, only some cough and mild R CP with heavy activity. Given PE history will check CT chest and basic labs.    Update:  CTA concerning for B/L PE, but read as possible chronic or resolving due to appearance. Bilateral PEs are concerning, but pt already anti-coagulated, and since exertional cough present for over a month and improving recently, could be improving PE on Eliquis, not definite treatment failure. Pt had varicose vein surgery 10/16 that could have led to DVT and then PE, but no known hypercoaguable workup.     Discussed with Dr. Calvert, Hematology fellow on call, who agreed that it is unclear if PE seen today represents treatment failure and indication to switch to Lovenox, vs improving PE that was present, especially given lack of SOB/hypoxia. Pt should get hypercoaguable workup, but reasonable to do this in clinic as outpatient, when PE symptoms can be re-assessed for possible change in anti-coagulation. Discussed this with patient and he agrees with this plan, and will make Hematology appt for next week, return to ED for any SOB or other concerns.    Clinical Tests:   Lab Tests: Ordered and Reviewed  Radiological Study: Reviewed and Ordered  Medical Tests: Reviewed and Ordered       APC / Resident Notes:              Attending Attestation:     Physician Attestation Statement for NP/PA:   I have conducted a face to face encounter with this patient in addition to the NP/PA, due to Medical Complexity    Other NP/PA Attestation Additions:      Medical Decision Making:   See MDM  (written by attending)                 ED Course     Clinical Impression:   The primary encounter diagnosis was Other pulmonary embolism without acute cor pulmonale, unspecified chronicity. A diagnosis of Shortness of breath was also pertinent to this visit.          Aneudy Bernal MD  05/05/17 0438

## 2017-05-04 NOTE — ED NOTES
Pt identifiers Marcio Krishnan checked and correct  LOC: The patient is awake, alert, aware of environment with an appropriate affect. Oriented x3, speaking appropriately  APPEARANCE: Pt resting comfortably, in no acute distress, pt is clean and well groomed, clothing properly fastened  SKIN: Skin warm, dry and intact, normal skin turgor, moist mucus membranes. Redness and warmth noted to left big toe.   RESPIRATORY: Airway is open and patent, respirations are spontaneous, even and unlabored, normal effort and rate  CARDIAC: Normal rate and rhythm, no peripheral edema noted, capillary refill < 3 seconds, bilateral radial pulses 2+, 2+ pedal pulses  ABDOMEN: Soft, nontender, nondistended. Bowel sounds present   NEUROLOGIC: facial expression is symmetrical, patient moving all extremities spontaneously, normal sensation in all extremities when touched with a finger.  Follows all commands appropriately  MUSCULOSKELETAL: No obvious deformities.

## 2017-05-04 NOTE — TELEPHONE ENCOUNTER
I spoke to patient. He is having intermittent shortness of breath.  CXR and EKG are ok. Labs worrisome. LFTs are 4 x upper limit of normal. I worry about multiple pulmonary emboli, Eliquis failure, undiagnosed underlying hypercoagulable state, and that he may need to change to Lovenox.  He agrees to go to the ER for CTA which should be able to tell if Pulmonary Emboli are present and also if there if the PE  are new or old. He also agrees to be admitted to the hospital for observation if needed.

## 2017-05-04 NOTE — PROVIDER PROGRESS NOTES - EMERGENCY DEPT.
Encounter Date: 5/4/2017    ED Physician Progress Notes         EKG - STEMI Decision  Initial Reading: No STEMI present.  Response: No Action Needed.

## 2017-05-04 NOTE — ED TRIAGE NOTES
Pt reports having blood clot behind the knee in his right leg, discovered one month ago. Pt put on eloquis. Pt reports having fatigue, constipation, productive cough, SOB. Denies chest pain. Pt reports now having diarrhea. Reports starting colchicine. Reports last BM was today around 12.

## 2017-05-04 NOTE — ED NOTES
ISTAT results:    Na+ = 140  K+ = 3.7  Cl- = 97  iCa = 1.17  TCO2 = 28  Glu = 95  BUN = 11  Creat = 0.7  Hct = 49%

## 2017-05-04 NOTE — ED AVS SNAPSHOT
[facility]  [address]  [facility] [address]               Marcio Becerra   2017  5:03 PM   ED    Description:  Male : 1965   Department:  [facility]           Your Care was Coordinated By:     Provider Role From To    Aneudy Bernal MD Attending Provider 17 2161 --    Rhoda Palm PA-C Physician Assistant 17 --      Reason for Visit     Shortness of Breath           Diagnoses this Visit        Comments    Other pulmonary embolism without acute cor pulmonale, unspecified chronicity    -  Primary     Shortness of breath           ED Disposition     None           To Do List           Follow-up Information     Follow up with [facility]. Schedule an appointment as soon as possible for a visit in 3 days.    Specialty:  Hematology and Oncology    Why:  For reevaluation    Contact information:    [address]  [facility] [id number] [phone]    Additional information:    [facility]      [facility]     [facility] Nurse Care Line -  Assistance  Unless otherwise directed by your provider, please contact [facility], our nurse care line that is available for  assistance.     Registered nurses in the [facility] provide: appointment scheduling, clinical advisement, health education, and other advisory services.  Call: [phone] (toll free)               Medications           Message regarding Medications     Verify the changes and/or additions to your medication regime listed below are the same as discussed with your clinician today.  If any of these changes or additions are incorrect, please notify your healthcare provider.        These medications were administered today        Dose Freq    omnipaque 350 iohexol 75 mL 75 mL IMG once as needed    Sig: Inject 75 mLs into the vein ONCE PRN for contrast.    Class: Normal    Route:  Intravenous           Verify that the below list of medications is an accurate representation of the medications you are currently taking.  If none reported, the list may be blank. If incorrect, please contact your healthcare provider. Carry this list with you in case of emergency.           Current Medications     albuterol 90 mcg/actuation inhaler Inhale 2 puffs into the lungs every 4 (four) hours as needed for Wheezing. Or bronchospasm    apixaban 5 mg Tab Take 1 tablet (5 mg total) by mouth 2 (two) times daily.    benzonatate (TESSALON) 200 MG capsule Take 1 capsule (200 mg total) by mouth 3 (three) times daily as needed for Cough.    colchicine 0.6 mg tablet Take two tablets at first and may repeat one additional tablet if there is no relief in one hour today.  Then, tomorrow take one tablet once or twice daily until all of the pain and swelling has resolved.    fluticasone (FLONASE) 50 mcg/actuation nasal spray 1 spray by Each Nare route once daily.           Clinical Reference Information           Your Vitals Were     BP Pulse Temp Resp Height Weight    147/93 61 98.7 °F (37.1 °C) (Oral) 11 6' (1.829 m) 90.7 kg (200 lb)    SpO2 BMI             100% 27.12 kg/m2         Allergies as of 5/4/2017     No Known Allergies      Immunizations Administered on Date of Encounter - 5/4/2017     None      ED Micro, Lab, POCT     Start Ordered       Status Ordering Provider    05/04/17 1757 05/04/17 1757  ISTAT PROCEDURE  Once      Final result     05/04/17 1732 05/04/17 1733  Brain natriuretic peptide  STAT      Final result     05/04/17 1732 05/04/17 1733  Hepatic function panel  Once      Final result     05/04/17 1729 05/04/17 1733  Troponin I  STAT      Final result     05/04/17 1729 05/04/17 1733  CBC auto differential  STAT      Final result     05/04/17 1729 05/04/17 1733  ISTAT CHEM8  Once      Acknowledged       ED Imaging Orders     Start Ordered       Status Ordering Provider    05/04/17 1738 05/04/17 1738  CTA  Chest Non-Coronary (PE Study)  1 time imaging      Final result     05/04/17 1729 05/04/17 1733    1 time imaging,   Status:  Canceled      Canceled       Discharge References/Attachments     PULMONARY EMBOLISM, DISCHARGE INSTRUCTIONS FOR (ENGLISH)      Your Scheduled Appointments     May 26, 2017  3:00 PM CDT   Established Patient Visit with MD Jarrod Garcia - Internal Medicine ([facility])    [address]  [facility] [id number] [phone]            Jun 26, 2017 10:00 AM CDT   Venous Unilateral Leg with VASCULAR, CARDIOLOGY   Jarrod King - Vascular Cardiology ([facility] )    [address]  [place] [id number] [phone]              [facility] Sign-Up     Activating your [facility] account is as easy as 1-2-3!     1) Visit [email or web address], select Sign Up Now, enter this activation code and your date of birth, then select Next.  [email or web address]  Expires: 5/13/2017  8:42 AM      2) Create a username and password to use when you visit [facility] in the future and select a security question in case you lose your password and select Next.    3) Enter your e-mail address and click Sign Up!    Additional Information  If you have questions, please e-mail [email or web address] or call [phone] to talk to our [facility] staff. Remember, [facility] is NOT to be used for urgent needs. For medical emergencies, dial 911.         Smoking Cessation     If you would like to quit smoking:   You may be eligible for free services if you are a [place] resident and started smoking cigarettes before September 1, 1988.  Call the Smoking Cessation Trust (SCT) toll free at (230) 850-9794 or (671) 553-8861.   Call 0-800-QUIT-NOW if you do not meet the above criteria.   Contact us via email: [email or web address]   View our website for more information: [email or web address]         [facility] complies with applicable  Federal civil rights laws and does not discriminate on the basis of race, color, national origin, age, disability, or sex.        Language Assistance Services     ATTENTION: Language assistance services are available, free of charge. Please call [phone].      ATENCIÓN: Si habla anastacia, tiene a taylor disposición servicios gratuitos de asistencia lingüística. Llame al [phone].     CHÚ Ý: N?u b?n nói Ti?ng Vi?t, có các d?ch v? h? tr? ngôn ng? mi?n phí dành cho b?n. G?i s? [phone].

## 2017-05-08 ENCOUNTER — INITIAL CONSULT (OUTPATIENT)
Dept: HEMATOLOGY/ONCOLOGY | Facility: CLINIC | Age: 52
End: 2017-05-08
Payer: COMMERCIAL

## 2017-05-08 ENCOUNTER — TELEPHONE (OUTPATIENT)
Dept: HEMATOLOGY/ONCOLOGY | Facility: CLINIC | Age: 52
End: 2017-05-08

## 2017-05-08 VITALS
TEMPERATURE: 98 F | HEART RATE: 78 BPM | DIASTOLIC BLOOD PRESSURE: 88 MMHG | SYSTOLIC BLOOD PRESSURE: 128 MMHG | WEIGHT: 197.06 LBS | BODY MASS INDEX: 26.69 KG/M2 | HEIGHT: 72 IN

## 2017-05-08 DIAGNOSIS — I82.4Y1 ACUTE DEEP VEIN THROMBOSIS (DVT) OF PROXIMAL VEIN OF RIGHT LOWER EXTREMITY: Primary | ICD-10-CM

## 2017-05-08 DIAGNOSIS — I26.99 OTHER PULMONARY EMBOLISM WITHOUT ACUTE COR PULMONALE, UNSPECIFIED CHRONICITY: ICD-10-CM

## 2017-05-08 DIAGNOSIS — I82.4Z9 DEEP VEIN THROMBOSIS (DVT) OF DISTAL VEIN OF LOWER EXTREMITY, UNSPECIFIED CHRONICITY, UNSPECIFIED LATERALITY: Primary | ICD-10-CM

## 2017-05-08 PROCEDURE — 99205 OFFICE O/P NEW HI 60 MIN: CPT | Mod: S$GLB,,, | Performed by: INTERNAL MEDICINE

## 2017-05-08 PROCEDURE — 99999 PR PBB SHADOW E&M-EST. PATIENT-LVL III: CPT | Mod: PBBFAC,,, | Performed by: INTERNAL MEDICINE

## 2017-05-08 PROCEDURE — 1160F RVW MEDS BY RX/DR IN RCRD: CPT | Mod: S$GLB,,, | Performed by: INTERNAL MEDICINE

## 2017-05-08 NOTE — TELEPHONE ENCOUNTER
----- Message from Gail Shaikh sent at 5/5/2017  9:44 AM CDT -----  Contact: Self   Pt has Multiple pulmonary embolisms. Needs to see a hematologist within 3 days he has just been discharged from the ED. Please call pt [phone]

## 2017-05-08 NOTE — PROGRESS NOTES
Subjective:       Patient ID: Marcio Becerra is a 52 y.o. male.    Chief Complaint: Coagulation Disorder    Marcio presents with his wife for evaluation of a prior right popliteal vein thrombosis and recently recognized bilateral pulmonary emboli in the distal left main pulmonary artery and distal right main pulmonary artery. The right lower extremity deep vein thrombosis was diagnosed about 5 weeks ago. Marcio was on a bike ride (triathlete) when he developed severe, acute right lower extremity pain and swelling. He was evaluated by his vascular surgeon who had performed prior varicose vein surgery and found to have a DVT by doppler ultrasound. He as immediately started on Eliquis oral anticoagulation therapy and repeat imaging after 1 month still had a right lower extremity DVT. More recently while being evaluated by his PCP last week for new left toe gout symptoms he was noted to have chronic chest tightness with deep breathing since onset or even prior to diagnosis of his DVT. CTA imaging in the ER is consistent with bilateral pulmonary emboli. Their chronicity cannot be truly known without prior imaging for comparison but their thread like appearance is more consistent with improving clot burden. Marcio has not had any bleeding events on anticoagulation. He had fatigue at therapy initiation that has improved. He does have a mild transaminitis that may be due to therapy, but this has also occurred on historical labs.    HPI  Review of Systems   Constitutional: Negative.    HENT: Negative.    Eyes: Negative.    Respiratory: Positive for chest tightness.    Cardiovascular: Positive for leg swelling.   Gastrointestinal: Negative.  Negative for vomiting.   Endocrine: Negative.    Genitourinary: Negative.    Skin: Negative.    Allergic/Immunologic: Negative for environmental allergies, food allergies and immunocompromised state.   Neurological: Negative.    Hematological: Negative for adenopathy. Does not bruise/bleed  easily.   Psychiatric/Behavioral: Negative.        Objective:      Physical Exam   Constitutional: He is oriented to person, place, and time. He appears well-developed and well-nourished.   HENT:   Head: Normocephalic and atraumatic.   Mouth/Throat: No oropharyngeal exudate.   Eyes: Conjunctivae are normal. No scleral icterus.   Neck: Normal range of motion. Neck supple.   Cardiovascular: Normal rate and intact distal pulses.    Pulmonary/Chest: Effort normal. No respiratory distress.   Abdominal: He exhibits no distension. There is no tenderness.   Musculoskeletal: Normal range of motion. He exhibits no edema.   Neurological: He is alert and oriented to person, place, and time. No cranial nerve deficit.   Skin: Skin is warm and dry.   Psychiatric: He has a normal mood and affect. His behavior is normal. Judgment and thought content normal.   Nursing note and vitals reviewed.      Assessment:       1. Acute deep vein thrombosis (DVT) of proximal vein of right lower extremity        Plan:       Very low suspicion that patient has failed anticoagulation therapy; greater suspicion that PE was present at DVT diagnosis    1. Continue Eliquis for a total of 3 months  2. At 3 months repeat CTA of chest, doppler of lower extremities, and d-dimer- if normal stop anticoagulation therapy; if clot remains or d-dimer extremely elevated continue therapy for another 3 months  3. Once eliquis has been discontinued for at least 6 weeks, complete thrombophilia profile for potential clotting disorder.    Return visit with CTA of chest, doppler ultrasound of lower extremities, and d-dimer in 7 weeks

## 2017-05-08 NOTE — LETTER
May 8, 2017      Opal Doll MD  [address]  [facility] [address]           [facility]  [address]  [facility] [address]  Phone: [phone]          Patient: Marcio Becerra   MR Number: [id number]   YOB: 1965   Date of Visit: 5/8/2017       Dear Dr. Opal Doll:    Thank you for referring Marcio Becerra to me for evaluation. Attached you will find relevant portions of my assessment and plan of care.    If you have questions, please do not hesitate to call me. I look forward to following Marcio Becerra along with you.    Sincerely,    Shawanda Fernandez MD    Enclosure  CC:  No Recipients    If you would like to receive this communication electronically, please contact [email or web address] or [phone] to request more information on [employer] Link access.    For providers and/or their staff who would like to refer a patient to [facility], please contact us through our one-stop-shop provider referral line, [facility] , at [phone].    If you feel you have received this communication in error or would no longer like to receive these types of communications, please e-mail [email or web address]

## 2017-06-07 RX ORDER — COLCHICINE 0.6 MG/1
TABLET ORAL
Qty: 30 TABLET | Refills: 0 | Status: SHIPPED | OUTPATIENT
Start: 2017-06-07

## 2017-06-23 ENCOUNTER — TELEPHONE (OUTPATIENT)
Dept: RADIOLOGY | Facility: HOSPITAL | Age: 52
End: 2017-06-23

## 2017-06-26 ENCOUNTER — HOSPITAL ENCOUNTER (OUTPATIENT)
Dept: RADIOLOGY | Facility: HOSPITAL | Age: 52
Discharge: HOME | End: 2017-06-26
Attending: INTERNAL MEDICINE
Payer: COMMERCIAL

## 2017-06-26 ENCOUNTER — CLINICAL SUPPORT (OUTPATIENT)
Dept: CARDIOLOGY | Facility: CLINIC | Age: 52
End: 2017-06-26
Payer: COMMERCIAL

## 2017-06-26 DIAGNOSIS — I82.4Y9 ACUTE DEEP VEIN THROMBOSIS (DVT) OF PROXIMAL VEIN OF LOWER EXTREMITY, UNSPECIFIED LATERALITY: ICD-10-CM

## 2017-06-26 DIAGNOSIS — I82.4Y1 ACUTE DEEP VEIN THROMBOSIS (DVT) OF PROXIMAL VEIN OF RIGHT LOWER EXTREMITY: ICD-10-CM

## 2017-06-26 PROCEDURE — 93971 EXTREMITY STUDY: CPT | Mod: S$GLB,,, | Performed by: INTERNAL MEDICINE

## 2017-06-26 PROCEDURE — 71275 CT ANGIOGRAPHY CHEST: CPT | Mod: 26,,, | Performed by: RADIOLOGY

## 2017-06-26 PROCEDURE — 25500020 PHARM REV CODE 255: Performed by: INTERNAL MEDICINE

## 2017-06-26 PROCEDURE — 71275 CT ANGIOGRAPHY CHEST: CPT | Mod: TC

## 2017-06-26 RX ADMIN — IOHEXOL 75 ML: 350 INJECTION, SOLUTION INTRAVENOUS at 12:06

## 2017-06-27 ENCOUNTER — OFFICE VISIT (OUTPATIENT)
Dept: HEMATOLOGY/ONCOLOGY | Facility: CLINIC | Age: 52
End: 2017-06-27
Payer: COMMERCIAL

## 2017-06-27 ENCOUNTER — TELEPHONE (OUTPATIENT)
Dept: CARDIOLOGY | Facility: CLINIC | Age: 52
End: 2017-06-27

## 2017-06-27 VITALS
TEMPERATURE: 98 F | HEIGHT: 72 IN | WEIGHT: 203.25 LBS | SYSTOLIC BLOOD PRESSURE: 137 MMHG | BODY MASS INDEX: 27.53 KG/M2 | DIASTOLIC BLOOD PRESSURE: 94 MMHG | HEART RATE: 67 BPM

## 2017-06-27 DIAGNOSIS — I82.4Y1 ACUTE DEEP VEIN THROMBOSIS (DVT) OF PROXIMAL VEIN OF RIGHT LOWER EXTREMITY: Primary | ICD-10-CM

## 2017-06-27 DIAGNOSIS — D68.9 COAGULATION DEFECT: ICD-10-CM

## 2017-06-27 PROCEDURE — 99999 PR PBB SHADOW E&M-EST. PATIENT-LVL III: CPT | Mod: PBBFAC,,, | Performed by: INTERNAL MEDICINE

## 2017-06-27 PROCEDURE — 99215 OFFICE O/P EST HI 40 MIN: CPT | Mod: S$GLB,,, | Performed by: INTERNAL MEDICINE

## 2017-06-27 NOTE — TELEPHONE ENCOUNTER
Called patient to schedule 3 mo f/u for vascular medicne.  Patient states he just left appt with Dr. Fernandez and that she is following his case regarding his DVT.  Instructed to call if he needed any help scheduling vascular medicine.

## 2017-06-27 NOTE — PROGRESS NOTES
Subjective:       Patient ID: Marcio Becerra is a 52 y.o. male.    Chief Complaint: Coagulation Disorder    Marcio presents with his wife for evaluation of a prior right popliteal vein thrombosis and recently recognized bilateral pulmonary emboli in the distal left main pulmonary artery and distal right main pulmonary artery. The right lower extremity deep vein thrombosis was diagnosed about 5 weeks ago. Marcio was on a bike ride (triathlete) when he developed severe, acute right lower extremity pain and swelling. He was evaluated by his vascular surgeon who had performed prior varicose vein surgery and found to have a DVT by doppler ultrasound. He as immediately started on Eliquis oral anticoagulation therapy and repeat imaging after 1 month still had a right lower extremity DVT. More recently while being evaluated by his PCP last week for new left toe gout symptoms he was noted to have chronic chest tightness with deep breathing since onset or even prior to diagnosis of his DVT. CTA imaging in the ER is consistent with bilateral pulmonary emboli. Their chronicity cannot be truly known without prior imaging for comparison but their thread like appearance is more consistent with improving clot burden. Marcio has not had any bleeding events on anticoagulation. He had fatigue at therapy initiation that has improved. He does have a mild transaminitis that may be due to therapy, but this has also occurred on historical labs.    Follow-up 6/27/17  Ddimer normal  CT and ultrasound show partial thrombosis  Right leg pain with activity    HPI  Review of Systems   Constitutional: Negative.    HENT: Negative.    Eyes: Negative.    Respiratory: Negative for chest tightness.    Cardiovascular: Positive for leg swelling.   Gastrointestinal: Negative.  Negative for vomiting.   Endocrine: Negative.    Genitourinary: Negative.    Skin: Negative.    Allergic/Immunologic: Negative for environmental allergies, food allergies and  immunocompromised state.   Neurological: Negative.    Hematological: Negative for adenopathy. Does not bruise/bleed easily.   Psychiatric/Behavioral: Negative.        Objective:      Physical Exam   Constitutional: He is oriented to person, place, and time. He appears well-developed and well-nourished.   HENT:   Head: Normocephalic and atraumatic.   Mouth/Throat: No oropharyngeal exudate.   Eyes: Conjunctivae are normal. No scleral icterus.   Neck: Normal range of motion. Neck supple.   Cardiovascular: Normal rate and intact distal pulses.    Pulmonary/Chest: Effort normal. No respiratory distress.   Abdominal: He exhibits no distension. There is no tenderness.   Musculoskeletal: Normal range of motion. He exhibits no edema.   Neurological: He is alert and oriented to person, place, and time. No cranial nerve deficit.   Skin: Skin is warm and dry.   Psychiatric: He has a normal mood and affect. His behavior is normal. Judgment and thought content normal.   Nursing note and vitals reviewed.      Assessment:       1. Acute deep vein thrombosis (DVT) of proximal vein of right lower extremity    2. Coagulation defect        Plan:       Very low suspicion that patient has failed anticoagulation therapy; greater suspicion that PE was present at DVT diagnosis    1. Continue Eliquis for a total of 6 months  2. At 3 months repeat CTA of chest, doppler of lower extremities, and d-dimer- if normal stop anticoagulation therapy; if clot remains or d-dimer extremely elevated continue therapy for another 3 months  3. Once eliquis has been discontinued for at least 6 weeks, complete thrombophilia profile for potential clotting disorder.    Return visit with CTA of chest, doppler ultrasound of lower extremities, and d-dimer in 3 months

## 2017-06-27 NOTE — LETTER
June 28, 2017      Opal Doll MD  [address]  [facility] [address]           [facility]  [address]  [facility] [address]  Phone: [phone]          Patient: Marcio Becerra   MR Number: [id number]   YOB: 1965   Date of Visit: 6/27/2017       Dear Dr. Opal Doll:    Thank you for referring Marcio Becerra to me for evaluation. Attached you will find relevant portions of my assessment and plan of care.    If you have questions, please do not hesitate to call me. I look forward to following Marcio Becerra along with you.    Sincerely,    Shawanda Fernandez MD    Enclosure  CC:  No Recipients    If you would like to receive this communication electronically, please contact [email or web address] or [phone] to request more information on [employer] Link access.    For providers and/or their staff who would like to refer a patient to [facility], please contact us through our one-stop-shop provider referral line, [facility] , at [phone].    If you feel you have received this communication in error or would no longer like to receive these types of communications, please e-mail [email or web address]

## 2017-06-29 ENCOUNTER — TELEPHONE (OUTPATIENT)
Dept: INTERNAL MEDICINE | Facility: CLINIC | Age: 52
End: 2017-06-29

## 2017-06-29 DIAGNOSIS — R74.8 ELEVATED LIVER ENZYMES: Primary | ICD-10-CM

## 2017-06-29 DIAGNOSIS — M10.9 GOUT, UNSPECIFIED CAUSE, UNSPECIFIED CHRONICITY, UNSPECIFIED SITE: ICD-10-CM

## 2017-06-29 NOTE — TELEPHONE ENCOUNTER
Was able to reach pt today  Spoke with pt discussed his   Recent test and hematology follow up  Pt doing well denies sob  Has persistant le dvt, ct improving   Has reduced alcohol intake discussed liver funciton  And would like to recheck labs  Repeat uric acid with gout sx  List of foods to avoid sent  Discussed rescheduling follow up appt he missed  He declined at this time having some insurance issues  But reports will do so when sorted out     Please call and assist in scheduling repeat labs

## 2017-07-13 ENCOUNTER — TELEPHONE (OUTPATIENT)
Dept: INTERNAL MEDICINE | Facility: CLINIC | Age: 52
End: 2017-07-13

## 2017-07-13 NOTE — TELEPHONE ENCOUNTER
Dear Dr. Fernandez and Dr. Doll,  I was wondering if this gentleman who I saw urgently, may require long-term anticoagulation because of his anatomy.  He has had varicose veins removed. Therefore, he has poor collateral circulation placing him at risk for chronically swollen legs and a higher than average risk of recurrent deep venous thrombosis.  What do you think?  Sincerely, Dr. Shari Peralta

## 2017-09-29 ENCOUNTER — HOSPITAL ENCOUNTER (OUTPATIENT)
Dept: RADIOLOGY | Facility: HOSPITAL | Age: 52
Discharge: HOME | End: 2017-09-29
Attending: INTERNAL MEDICINE
Payer: COMMERCIAL

## 2017-09-29 DIAGNOSIS — I82.4Y1 ACUTE DEEP VEIN THROMBOSIS (DVT) OF PROXIMAL VEIN OF RIGHT LOWER EXTREMITY: ICD-10-CM

## 2017-09-29 DIAGNOSIS — D68.9 COAGULATION DEFECT: ICD-10-CM

## 2017-09-29 PROCEDURE — 71275 CT ANGIOGRAPHY CHEST: CPT | Mod: TC

## 2017-09-29 PROCEDURE — 93970 EXTREMITY STUDY: CPT | Mod: 26,,, | Performed by: RADIOLOGY

## 2017-09-29 PROCEDURE — 71275 CT ANGIOGRAPHY CHEST: CPT | Mod: 26,,, | Performed by: RADIOLOGY

## 2017-09-29 PROCEDURE — 25500020 PHARM REV CODE 255: Performed by: INTERNAL MEDICINE

## 2017-09-29 PROCEDURE — 93970 EXTREMITY STUDY: CPT | Mod: TC

## 2017-09-29 RX ADMIN — IOHEXOL 75 ML: 350 INJECTION, SOLUTION INTRAVENOUS at 11:09

## 2017-10-13 DIAGNOSIS — I82.5Y1 CHRONIC DEEP VEIN THROMBOSIS (DVT) OF PROXIMAL VEIN OF RIGHT LOWER EXTREMITY: Primary | ICD-10-CM

## 2017-10-13 DIAGNOSIS — I26.99 OTHER ACUTE PULMONARY EMBOLISM WITHOUT ACUTE COR PULMONALE: ICD-10-CM

## 2017-11-01 ENCOUNTER — TELEPHONE (OUTPATIENT)
Dept: INTERNAL MEDICINE | Facility: CLINIC | Age: 52
End: 2017-11-01

## 2017-11-01 DIAGNOSIS — I82.4Y9 ACUTE DEEP VEIN THROMBOSIS (DVT) OF PROXIMAL VEIN OF LOWER EXTREMITY, UNSPECIFIED LATERALITY: ICD-10-CM

## 2017-11-01 NOTE — TELEPHONE ENCOUNTER
----- Message from Sanders Quita sent at 11/1/2017 10:28 AM CDT -----  Contact: self/ [phone] cell  Type: Rx    Name of medication(s): apixaban 5 mg Tab    Is this a refill? New rx? refill    Who prescribed medication? Dr. Benson    Pharmacy Name, Phone, & Location: [facility] [address]    Comments: Pt states that he needs a refill on the medication above.  The prescribing provider is no longer with [facility].  He would like his PCP to refill the medication.  The pt is working out of town and can't come in for an appt anytime soon.  Please call and advise.    Thank you

## 2017-11-01 NOTE — TELEPHONE ENCOUNTER
A Refill sent as requested pt will need to reschedule his follow up with hematology/oncology dr. Fernandez for additional recommendations and follow up appt when he returns to [facility] with me

## 2018-01-10 ENCOUNTER — TELEPHONE (OUTPATIENT)
Dept: HEMATOLOGY/ONCOLOGY | Facility: CLINIC | Age: 53
End: 2018-01-10

## 2018-01-10 NOTE — TELEPHONE ENCOUNTER
----- Message from Torrey Garsia sent at 1/10/2018 10:40 AM CST -----  Contact: Spouse   Will like to reschedule CT CTA Appt to next week     Contact::[phone]

## 2018-01-17 ENCOUNTER — HOSPITAL ENCOUNTER (OUTPATIENT)
Dept: RADIOLOGY | Facility: HOSPITAL | Age: 53
Discharge: HOME | End: 2018-01-17
Attending: INTERNAL MEDICINE
Payer: COMMERCIAL

## 2018-01-17 DIAGNOSIS — I82.5Y1 CHRONIC DEEP VEIN THROMBOSIS (DVT) OF PROXIMAL VEIN OF RIGHT LOWER EXTREMITY: ICD-10-CM

## 2018-01-17 DIAGNOSIS — I26.99 OTHER ACUTE PULMONARY EMBOLISM WITHOUT ACUTE COR PULMONALE: ICD-10-CM

## 2018-01-17 PROCEDURE — 25500020 PHARM REV CODE 255: Performed by: INTERNAL MEDICINE

## 2018-01-17 PROCEDURE — 71275 CT ANGIOGRAPHY CHEST: CPT | Mod: 26,,, | Performed by: RADIOLOGY

## 2018-01-17 PROCEDURE — 71275 CT ANGIOGRAPHY CHEST: CPT | Mod: TC

## 2018-01-17 PROCEDURE — 93970 EXTREMITY STUDY: CPT | Mod: 26,,, | Performed by: RADIOLOGY

## 2018-01-17 PROCEDURE — 93970 EXTREMITY STUDY: CPT | Mod: TC

## 2018-01-17 RX ADMIN — IOHEXOL 75 ML: 350 INJECTION, SOLUTION INTRAVENOUS at 02:01

## 2018-01-22 ENCOUNTER — OFFICE VISIT (OUTPATIENT)
Dept: HEMATOLOGY/ONCOLOGY | Facility: CLINIC | Age: 53
End: 2018-01-22
Payer: COMMERCIAL

## 2018-01-22 VITALS
TEMPERATURE: 98 F | HEART RATE: 64 BPM | RESPIRATION RATE: 12 BRPM | DIASTOLIC BLOOD PRESSURE: 100 MMHG | SYSTOLIC BLOOD PRESSURE: 151 MMHG | HEIGHT: 72 IN | BODY MASS INDEX: 28.07 KG/M2 | WEIGHT: 207.25 LBS

## 2018-01-22 DIAGNOSIS — I82.5Y1 CHRONIC DEEP VEIN THROMBOSIS (DVT) OF PROXIMAL VEIN OF RIGHT LOWER EXTREMITY: Primary | ICD-10-CM

## 2018-01-22 PROCEDURE — 99999 PR PBB SHADOW E&M-EST. PATIENT-LVL III: CPT | Mod: PBBFAC,,, | Performed by: INTERNAL MEDICINE

## 2018-01-22 PROCEDURE — 99215 OFFICE O/P EST HI 40 MIN: CPT | Mod: S$GLB,,, | Performed by: INTERNAL MEDICINE

## 2018-01-22 NOTE — PROGRESS NOTES
Subjective:       Patient ID: Marcio Becerra is a 52 y.o. male.    Chief Complaint: Follow-up and Coagulation Disorder    Marcio presents with his wife for evaluation of a prior right popliteal vein thrombosis and recently recognized bilateral pulmonary emboli in the distal left main pulmonary artery and distal right main pulmonary artery. The right lower extremity deep vein thrombosis was diagnosed about 5 weeks ago. Marcio was on a bike ride (triathlete) when he developed severe, acute right lower extremity pain and swelling. He was evaluated by his vascular surgeon who had performed prior varicose vein surgery and found to have a DVT by doppler ultrasound. He as immediately started on Eliquis oral anticoagulation therapy and repeat imaging after 1 month still had a right lower extremity DVT. More recently while being evaluated by his PCP last week for new left toe gout symptoms he was noted to have chronic chest tightness with deep breathing since onset or even prior to diagnosis of his DVT. CTA imaging in the ER is consistent with bilateral pulmonary emboli. Their chronicity cannot be truly known without prior imaging for comparison but their thread like appearance is more consistent with improving clot burden. Marcio has not had any bleeding events on anticoagulation. He had fatigue at therapy initiation that has improved. He does have a mild transaminitis that may be due to therapy, but this has also occurred on historical labs.    Follow-up 6/27/17  Ddimer normal  CT and ultrasound show partial thrombosis  Right leg pain with activity    Follow-up 1/22/18  CTa- web formation  Ultrasound- bilateral chronic thrombosis    HPI  Review of Systems   Constitutional: Negative.    HENT: Negative.    Eyes: Negative.    Respiratory: Negative for chest tightness.    Cardiovascular: Positive for leg swelling.   Gastrointestinal: Negative.  Negative for vomiting.   Endocrine: Negative.    Genitourinary: Negative.    Skin:  Negative.    Allergic/Immunologic: Negative for environmental allergies, food allergies and immunocompromised state.   Neurological: Negative.    Hematological: Negative for adenopathy. Does not bruise/bleed easily.   Psychiatric/Behavioral: Negative.        Objective:      Physical Exam   Constitutional: He is oriented to person, place, and time. He appears well-developed and well-nourished.   HENT:   Head: Normocephalic and atraumatic.   Mouth/Throat: No oropharyngeal exudate.   Eyes: Conjunctivae are normal. No scleral icterus.   Neck: Normal range of motion. Neck supple.   Cardiovascular: Normal rate and intact distal pulses.    Pulmonary/Chest: Effort normal. No respiratory distress.   Abdominal: He exhibits no distension. There is no tenderness.   Musculoskeletal: Normal range of motion. He exhibits no edema.   Neurological: He is alert and oriented to person, place, and time. No cranial nerve deficit.   Skin: Skin is warm and dry.   Psychiatric: He has a normal mood and affect. His behavior is normal. Judgment and thought content normal.   Nursing note and vitals reviewed.      Assessment:       No diagnosis found.    Plan:       Very low suspicion that patient has failed anticoagulation therapy; greater suspicion that PE was present at DVT diagnosis    1.Stop Eliquis, thrombosis has had transformation to chronic clot and web formation  2. Start Aspirin 162mg daiy  3. Return in 2 months for thrombophilia evaluation

## 2018-01-26 DIAGNOSIS — I82.4Y9 ACUTE DEEP VEIN THROMBOSIS (DVT) OF PROXIMAL VEIN OF LOWER EXTREMITY, UNSPECIFIED LATERALITY: ICD-10-CM

## 2018-01-26 RX ORDER — APIXABAN 5 MG/1
5 TABLET, FILM COATED ORAL 2 TIMES DAILY
Qty: 60 TABLET | Refills: 0 | Status: SHIPPED | OUTPATIENT
Start: 2018-01-26

## 2018-03-19 ENCOUNTER — LAB VISIT (OUTPATIENT)
Dept: LAB | Facility: HOSPITAL | Age: 53
End: 2018-03-19
Attending: INTERNAL MEDICINE
Payer: COMMERCIAL

## 2018-03-19 DIAGNOSIS — I82.5Y1 CHRONIC DEEP VEIN THROMBOSIS (DVT) OF PROXIMAL VEIN OF RIGHT LOWER EXTREMITY: ICD-10-CM

## 2018-03-19 PROCEDURE — 86147 CARDIOLIPIN ANTIBODY EA IG: CPT | Mod: 59

## 2018-03-19 PROCEDURE — 85613 RUSSELL VIPER VENOM DILUTED: CPT

## 2018-03-19 PROCEDURE — 86146 BETA-2 GLYCOPROTEIN ANTIBODY: CPT | Mod: 59

## 2018-03-19 PROCEDURE — 36415 COLL VENOUS BLD VENIPUNCTURE: CPT

## 2018-03-19 PROCEDURE — 81241 F5 GENE: CPT

## 2018-03-19 PROCEDURE — 85302 CLOT INHIBIT PROT C ANTIGEN: CPT

## 2018-03-19 PROCEDURE — 85306 CLOT INHIBIT PROT S FREE: CPT

## 2018-03-19 PROCEDURE — 81240 F2 GENE: CPT

## 2018-03-20 LAB
CARDIOLIPIN IGG SER IA-ACNC: <9.4 GPL
CARDIOLIPIN IGM SER IA-ACNC: <9.4 MPL
LA PPP-IMP: NEGATIVE

## 2018-03-21 LAB — PROT C AG ACT/NOR PPP IA: 130 % (ref 70–150)

## 2018-03-22 LAB
B2 GLYCOPROT1 IGA SER QL: <9 SAU
B2 GLYCOPROT1 IGG SER QL: <9 SGU
B2 GLYCOPROT1 IGM SER QL: <9 SMU

## 2018-03-23 LAB
F2 GENE MUT ANL BLD/T: NORMAL
F5 P.R506Q BLD/T QL: ABNORMAL
PROT S FREE AG ACT/NOR PPP IA: 130 %

## 2018-04-02 ENCOUNTER — OFFICE VISIT (OUTPATIENT)
Dept: HEMATOLOGY/ONCOLOGY | Facility: CLINIC | Age: 53
End: 2018-04-02
Payer: COMMERCIAL

## 2018-04-02 VITALS
OXYGEN SATURATION: 100 % | DIASTOLIC BLOOD PRESSURE: 94 MMHG | RESPIRATION RATE: 16 BRPM | WEIGHT: 203.94 LBS | HEART RATE: 88 BPM | SYSTOLIC BLOOD PRESSURE: 140 MMHG | BODY MASS INDEX: 27.62 KG/M2 | TEMPERATURE: 99 F | HEIGHT: 72 IN

## 2018-04-02 DIAGNOSIS — D68.51 FACTOR V LEIDEN CARRIER: Primary | ICD-10-CM

## 2018-04-02 PROCEDURE — 99215 OFFICE O/P EST HI 40 MIN: CPT | Mod: S$GLB,,, | Performed by: INTERNAL MEDICINE

## 2018-04-02 PROCEDURE — 99999 PR PBB SHADOW E&M-EST. PATIENT-LVL III: CPT | Mod: PBBFAC,,, | Performed by: INTERNAL MEDICINE

## 2018-04-02 NOTE — PROGRESS NOTES
Subjective:       Patient ID: Marcio Becerra is a 53 y.o. male.    Chief Complaint: Coagulation Disorder    Marcio presents with his wife for evaluation of a prior right popliteal vein thrombosis and recently recognized bilateral pulmonary emboli in the distal left main pulmonary artery and distal right main pulmonary artery. The right lower extremity deep vein thrombosis was diagnosed about 5 weeks ago. Marcio was on a bike ride (triathlete) when he developed severe, acute right lower extremity pain and swelling. He was evaluated by his vascular surgeon who had performed prior varicose vein surgery and found to have a DVT by doppler ultrasound. He as immediately started on Eliquis oral anticoagulation therapy and repeat imaging after 1 month still had a right lower extremity DVT. More recently while being evaluated by his PCP last week for new left toe gout symptoms he was noted to have chronic chest tightness with deep breathing since onset or even prior to diagnosis of his DVT. CTA imaging in the ER is consistent with bilateral pulmonary emboli. Their chronicity cannot be truly known without prior imaging for comparison but their thread like appearance is more consistent with improving clot burden. Marcio has not had any bleeding events on anticoagulation. He had fatigue at therapy initiation that has improved. He does have a mild transaminitis that may be due to therapy, but this has also occurred on historical labs.    Follow-up 6/27/17  Ddimer normal  CT and ultrasound show partial thrombosis  Right leg pain with activity    Follow-up 1/22/18  CTa- web formation  Ultrasound- bilateral chronic thrombosis    Follow-up 4/2/18  Test results follow-up  Thrombophilia panel completed - only positive finding is heterozygote mutation for Factor V Leiden  Currently taking Aspirin 162 mg daily with no adverse events.    HPI  Review of Systems   Constitutional: Negative.    HENT: Negative.    Eyes: Negative.    Respiratory:  Negative for chest tightness.    Cardiovascular: Positive for leg swelling.   Gastrointestinal: Negative.  Negative for vomiting.   Endocrine: Negative.    Genitourinary: Negative.    Skin: Negative.    Allergic/Immunologic: Negative for environmental allergies, food allergies and immunocompromised state.   Neurological: Negative.    Hematological: Negative for adenopathy. Does not bruise/bleed easily.   Psychiatric/Behavioral: Negative.        Objective:      Physical Exam   Constitutional: He is oriented to person, place, and time. He appears well-developed and well-nourished.   HENT:   Head: Normocephalic and atraumatic.   Mouth/Throat: No oropharyngeal exudate.   Eyes: Conjunctivae are normal. No scleral icterus.   Neck: Normal range of motion. Neck supple.   Cardiovascular: Normal rate and intact distal pulses.    Pulmonary/Chest: Effort normal. No respiratory distress.   Abdominal: He exhibits no distension. There is no tenderness.   Musculoskeletal: Normal range of motion. He exhibits no edema.   Neurological: He is alert and oriented to person, place, and time. No cranial nerve deficit.   Skin: Skin is warm and dry.   Psychiatric: He has a normal mood and affect. His behavior is normal. Judgment and thought content normal.   Nursing note and vitals reviewed.      Assessment:       1. Factor V Leiden carrier        Plan:       Very low suspicion that patient has failed anticoagulation therapy; greater suspicion that PE was present at DVT diagnosis  Discussed role for heterozygote Factor V leiden mutation.   Not an indication to restart Eliquis therapy. Continue Aspirin 162 mg daily.  If he ever has a non-induced DVT/PE, then recommendation is for life long anticoagulation therapy.  This is not an indication to test family members who have no history of DVT or thrombosis.   Penetrance of mutation is 1-2/10 patients.    Discussed above.  Return to hematology as needed.

## 2019-02-15 ENCOUNTER — TELEPHONE (OUTPATIENT)
Dept: HEMATOLOGY/ONCOLOGY | Facility: CLINIC | Age: 54
End: 2019-02-15

## 2019-02-15 NOTE — TELEPHONE ENCOUNTER
Attempted to call patient. Voicemail left with baby aspirin instructions    ----- Message from Krishna Carbajal sent at 2/15/2019  9:32 AM CST -----  Contact: Patient  Pt needs to know what dosage of baby asprin was he place on a few months ago by the doctor.    Contact:: [phone]

## 2019-05-14 ENCOUNTER — TELEPHONE (OUTPATIENT)
Dept: DERMATOLOGY | Facility: CLINIC | Age: 54
End: 2019-05-14

## 2019-05-14 NOTE — TELEPHONE ENCOUNTER
----- Message from Emilie Gonzalez sent at 5/14/2019 11:19 AM CDT -----  Contact: PT  Needs Advice    Reason for call: Patient needs to schedule an appt has a bump on his back is really concerned about it.        Communication Preference:[phone]    Additional Information:

## 2019-05-14 NOTE — TELEPHONE ENCOUNTER
Called patient to schedule an appointment with Dermatology, per message. No answer. Message was left asking patient to call the office back for an appointment.

## 2021-01-27 ENCOUNTER — CLINICAL SUPPORT (OUTPATIENT)
Dept: URGENT CARE | Facility: CLINIC | Age: 56
End: 2021-01-27
Payer: COMMERCIAL

## 2021-01-27 DIAGNOSIS — Z20.822 EXPOSURE TO COVID-19 VIRUS: Primary | ICD-10-CM

## 2021-01-27 LAB
CTP QC/QA: YES
SARS-COV-2 RDRP RESP QL NAA+PROBE: NEGATIVE

## 2021-01-27 PROCEDURE — U0002 COVID-19 LAB TEST NON-CDC: HCPCS | Mod: QW,S$GLB,, | Performed by: NURSE PRACTITIONER

## 2021-01-27 PROCEDURE — U0002: ICD-10-PCS | Mod: QW,S$GLB,, | Performed by: NURSE PRACTITIONER

## 2024-05-13 ENCOUNTER — TELEPHONE (OUTPATIENT)
Dept: CARDIOLOGY | Facility: CLINIC | Age: 59
End: 2024-05-13
Payer: COMMERCIAL

## 2024-05-13 NOTE — TELEPHONE ENCOUNTER
----- Message from Gracie Villarreal sent at 5/13/2024 12:47 PM CDT -----  Regarding: Appt Access  Contact: [phone]  CONSULT/ADVISORY    Name of Caller:  Sarina Becerra ( Spouse)    Contact Preference:  [phone]    Nature of Call:  Requesting a NP appt for pt, states pt had a Heart Scan done at [facility] and was told he has an enlarged aorta.  States she can get a copy of the report if it's needed.  Please call.

## 2024-05-21 ENCOUNTER — OFFICE VISIT (OUTPATIENT)
Dept: CARDIOLOGY | Facility: CLINIC | Age: 59
End: 2024-05-21
Payer: COMMERCIAL

## 2024-05-21 VITALS
BODY MASS INDEX: 27.95 KG/M2 | DIASTOLIC BLOOD PRESSURE: 91 MMHG | HEIGHT: 72 IN | WEIGHT: 206.38 LBS | HEART RATE: 68 BPM | SYSTOLIC BLOOD PRESSURE: 145 MMHG

## 2024-05-21 DIAGNOSIS — R93.1 AGATSTON CORONARY ARTERY CALCIUM SCORE BETWEEN 100 AND 199: Primary | ICD-10-CM

## 2024-05-21 DIAGNOSIS — I77.810 AORTIC ROOT DILATATION: ICD-10-CM

## 2024-05-21 DIAGNOSIS — E78.00 PURE HYPERCHOLESTEROLEMIA: ICD-10-CM

## 2024-05-21 PROBLEM — G56.01 CARPAL TUNNEL SYNDROME ON RIGHT: Status: ACTIVE | Noted: 2022-08-22

## 2024-05-21 PROBLEM — Z79.82 LONG TERM (CURRENT) USE OF ASPIRIN: Status: ACTIVE | Noted: 2024-04-26

## 2024-05-21 PROBLEM — M50.20 CERVICAL HERNIATED DISC: Status: ACTIVE | Noted: 2024-04-26

## 2024-05-21 PROBLEM — Z87.39 HISTORY OF GOUT: Status: ACTIVE | Noted: 2024-04-26

## 2024-05-21 PROBLEM — M13.842: Status: ACTIVE | Noted: 2024-04-24

## 2024-05-21 PROBLEM — Z82.49 FAMILY HISTORY OF HEART DISEASE: Status: RESOLVED | Noted: 2024-04-26 | Resolved: 2024-05-21

## 2024-05-21 PROBLEM — I26.99 ACUTE PULMONARY EMBOLISM: Status: RESOLVED | Noted: 2017-05-08 | Resolved: 2024-05-21

## 2024-05-21 PROBLEM — M47.812 SPONDYLOSIS OF CERVICAL REGION WITHOUT MYELOPATHY OR RADICULOPATHY: Status: ACTIVE | Noted: 2022-06-29

## 2024-05-21 PROBLEM — R73.03 PREDIABETES: Status: ACTIVE | Noted: 2024-04-26

## 2024-05-21 PROBLEM — G25.0 ESSENTIAL TREMOR: Status: ACTIVE | Noted: 2024-04-24

## 2024-05-21 PROBLEM — G47.33 OBSTRUCTIVE SLEEP APNEA: Status: ACTIVE | Noted: 2024-04-24

## 2024-05-21 PROBLEM — Z87.891 PAST HISTORY OF CHEWING TOBACCO USE: Status: ACTIVE | Noted: 2024-04-26

## 2024-05-21 PROBLEM — R03.0 ELEVATED BP WITHOUT DIAGNOSIS OF HYPERTENSION: Status: ACTIVE | Noted: 2024-04-26

## 2024-05-21 PROBLEM — K76.0 FATTY LIVER: Status: ACTIVE | Noted: 2024-04-26

## 2024-05-21 PROBLEM — G25.81 RESTLESS LEGS SYNDROME: Status: ACTIVE | Noted: 2024-04-24

## 2024-05-21 PROBLEM — M72.0 DUPUYTREN'S CONTRACTURE OF HAND: Status: ACTIVE | Noted: 2024-04-24

## 2024-05-21 PROBLEM — Z86.711 PERSONAL HISTORY OF PULMONARY EMBOLISM: Status: ACTIVE | Noted: 2024-04-24

## 2024-05-21 PROBLEM — M54.12 CERVICAL RADICULOPATHY: Status: ACTIVE | Noted: 2022-06-02

## 2024-05-21 PROBLEM — E83.119 HEMOCHROMATOSIS: Status: ACTIVE | Noted: 2024-04-26

## 2024-05-21 PROBLEM — Z82.49 FAMILY HISTORY OF HEART DISEASE: Status: ACTIVE | Noted: 2024-04-26

## 2024-05-21 PROBLEM — E78.5 HYPERLIPIDEMIA, UNSPECIFIED: Status: ACTIVE | Noted: 2024-04-24

## 2024-05-21 PROBLEM — Z79.01 ANTICOAGULATED BY ANTICOAGULATION TREATMENT: Status: RESOLVED | Noted: 2017-05-03 | Resolved: 2024-05-21

## 2024-05-21 PROBLEM — D72.819 LEUKOPENIA: Status: ACTIVE | Noted: 2024-04-26

## 2024-05-21 PROCEDURE — 99214 OFFICE O/P EST MOD 30 MIN: CPT | Mod: 25,S$GLB,, | Performed by: INTERNAL MEDICINE

## 2024-05-21 PROCEDURE — 99999 PR PBB SHADOW E&M-EST. PATIENT-LVL III: CPT | Mod: PBBFAC,,, | Performed by: INTERNAL MEDICINE

## 2024-05-21 PROCEDURE — 93000 ELECTROCARDIOGRAM COMPLETE: CPT | Mod: S$GLB,,, | Performed by: INTERNAL MEDICINE

## 2024-05-21 RX ORDER — ASPIRIN 81 MG/1
2 TABLET ORAL DAILY
COMMUNITY

## 2024-05-21 RX ORDER — ROSUVASTATIN CALCIUM 5 MG/1
1 TABLET, COATED ORAL DAILY
COMMUNITY
Start: 2024-04-26 | End: 2024-05-21

## 2024-05-21 NOTE — PROGRESS NOTES
Subjective:   05/21/2024     Patient ID:  Marcio Becerra is a 59 y.o. male who presents for evaulation of enlarged aorta       Patient comes in for follow-up after an abnormal coronary calcium score.  This was 136.  That puts him in the 75th percentile.  He has never had a coronary event.  He has been a lifelong exerciser, did marathons.  Now continues to exercise, no exertional chest pains or tightness, no PND or orthopnea.      Does not have hypertension or diabetes.  Family history is negative for coronary artery disease.  He is never smoked cigarettes.      Note was made of ascending aortic dilatation measuring 4.2 x 4.25 cm.  The ascending aorta height ratio is 2.32, low risk.    STANTON Ten year risk calculates patient utilizing calcium score data is a 6.710 year risk for CHD event.  His coronary age would be 61.  Without considering the calcium score data, his 10 year risk of CHD event is 3.8%, coronary age 49.         Note from PCP or reviewed:   Patient Active Problem List  Diagnosis  Cervical radiculopathy  Spondylosis of cervical region without myelopathy or radiculopathy  Carpal tunnel syndrome on right  Essential tremor  Personal history of pulmonary embolism  Dupuytren's contracture of hand  Other specified arthritis, left hand  Tinnitus, right ear  Personal history of other malignant neoplasm of skin  Obstructive sleep apnea (adult) (pediatric)  Hyperlipidemia, unspecified  Restless legs syndrome  Factor V Leiden carrier (CMS/HCC)  Hemochromatosis  Cervical herniated disc  Fatty liver  Past history of chewing tobacco use  History of gout  Elevated BP without diagnosis of hypertension  Prediabetes  Leukopenia  Family history of heart disease  Long term (current) use of aspirin    Past Medical History:  Diagnosis Date  Cervical herniated disc 04/26/2024  Elevated BP without diagnosis of hypertension 04/26/2024  Family history of heart disease 04/26/2024  Fatty liver 04/26/2024  Hemochromatosis  04/26/2024  History of gout 04/26/2024  Leukopenia 04/26/2024  Long term (current) use of aspirin 04/26/2024  Prediabetes 04/26/2024    Past Surgical History:  Procedure Laterality Date  CARPAL TUNNEL RELEASE  REPAIR PRIMARY TORN LIGM&/CAPSULE KNEE CRUCIat Left  TRIGGER FINGER RELEASE  UNLISTED PROCEDURE ARTHROSCOPY Bilateral    Social History    Socioeconomic History  Marital status:   Spouse name: Not on file  Number of children: Not on file  Years of education: Not on file  Highest education level: Not on file  Occupational History  Not on file  Tobacco Use  Smoking status: Never  Passive exposure: Never  Smokeless tobacco: Former  Types: Chew  Quit date: 03/2023  Substance and Sexual Activity  Alcohol use: Yes  Comment: 3-4x/week; 4-8 beers  Drug use: Never  Sexual activity: Not on file  Other Topics Concern  Not on file  Social History Narrative  Consultation  Derm; Provider/Facility: Dr. [facility]  Hematology: [facility]  .  G25.0-Essential tremor  Essential tremor-[id number]  Status: no tremors today, reports gets tremors in hands when drinking alcohol or with strenous activity when flexes hand    Z86.711-Personal history of pulmonary embolism  H/O: pulmonary embolus-[id number]  D68.51 Factor V Leiden Carrier  Z79.82 Long term aspirin use  Status:PE in 2018, did 6 months of eliquis. Secondary to veinous ablation, Med mgmt aspirin. Mgmt per Dr. Bailey. Has appt with Dr. Mariano next week    E83.119 Hemachromatosis  Status: mgmt per Dr. Mariano, did have 1 phlebotomy session in 12/2023, since then per Dr. Mariano note, feritin has improved. Has appt with Dr. Mariano next week.    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 Advised to take with food and hydrate, will refill one more time.    H93.11-Tinnitus, right ear  Tinnitus-[id number]  Status: does having hearing loss and this may be related, can see ENT for eval    G47.33-Obstructive sleep apnea (adult) (pediatric)  Obstructive sleep apnea syndrome-[id number]  Status: no cpap, advised to sleep with hob elevated or on side    Z00.01-Encounter for general adult medical examination with abnormal findings  Adult health examination-[id number]  Status: colon utd. Due for eye exam/advised. Labs 4/25/2024 reviewed. Reports tetanus vaccine in last 3-4 years. Advised shingrix. Discussed calcium scoring/ordered.    E78.5-Hyperlipidemia, unspecified  Hyperlipidemia-[id number]  Status: Chol/LDL elevated,, ldl 125 on 4/25/2024 labs, goal is 100 orless, ascvd 10.4%, advised statin, side effects reviewed, escribed. Advised low fat diet and cont exercise  The 10-year ASCVD risk score (Heather MENDOZA, et al., 2019) is: 9.8%  Values used to calculate the score:  Age: 59 years  Sex: Male  Is Non- : No  Diabetic: No  Tobacco smoker: No  Systolic Blood Pressure: 160 mmHg  Is BP treated: No  HDL Cholesterol: 64 mg/dL  Total Cholesterol: 204 mg/dL    Z85.828-Personal history of other malignant neoplasm of skin  History of malignant neoplasm of skin-[id number]  Status: advised regular skin exam with dermatology    Z87.891 history of chewing Tobacco  Status: quit in 2023, aware can increase risk of oropharyngeal cancers,cont to see dentist regularly    Z87.39 history of Gout, unspecified  Status: cont low purine diet, no recent flares    G25.81-Restless legs syndrome  Restless legs-[id number]  Status: med mgmt with gabapentin prn at night, using sparingly, aware can increase risk of sedation and falls and cannot be combined with alcohol, driving, or other sedating  medications/hydrocodone.    M54.12-Radiculopathy, cervical region  Cervical radiculopathy-[id number]  M47.812 Cervical spondylosis  M50.20 Cervical herniated disc  G56.01 Right carpal tunnel  Status: s/p JOSE R per Dr. Galicia which helped significantly. S/p right carpal tunnel release also. Still has numbness in thumb and index finger    G47.00 Insomnia  Status: has tried and failed melatonin. Hydroxyzine 25mg at bedtime prn escribed; advised not to combine with gabapentin, hydrocodone, or other sedating medications. Healthy sleep habits advised    K76.0 Fatty liver  R16.0 hepatomegaly  R74.9 Elevated liver enzymes  Z78.9 Alcohol use  Status: noted on abd us, LFT on 4/25/2024 labs remain elevated. Does drink alcohol 4x/week with 4-8 beers at those sitting, advised to decrease. Fibroscan ordered. Discussed concern for scarring/cirrhosis    R73.03 Prediabetes  Status: noted on labs 4/25/2024, advised low carb diet and cont exercise    D 72.819 Leukopenia  Status: noted on labs 4/25/2024, will recheck in 1 month, has appt with Dr. Mariano next week, advised to discuss with him. Copy of lab sent to Dr. Mariano    R03.0 elevated bp without diagnosis of htn  Status: elevated today. Ensures that home reading are 120/80s. Cont to monitor/log bp and notify office if not meeting goal. Cont exercise. Advised low sodium and decrease alcohol.    M25.522 Left elbow pain  Status: s/p injection per ortho, states it is feeling better. Using hydrocodone prn. discussed high risk medication that cannot be combined with alcohol, driving, or other sedating medication. Advised to use sparingly. Cont with ortho    Z82.49 Family history of heart disease  Status: discussed calcium scoring/ ordered. On aspirin. Cont exercise. Advised to decrease alcohol    Return in about 6 months (around 10/26/2024) for or sooner if bp remains elevated, will have cbc done in 1 month; 6 month for regular follow-up.          Past Medical History:   Diagnosis Date     DJD (degenerative joint disease) of cervical spine     hx of     Fatty liver     Fever blister     Gout 05/2017    left big toe       Review of patient's allergies indicates:  No Known Allergies      Current Outpatient Medications:     aspirin (ECOTRIN) 81 MG EC tablet, Take 2 tablets by mouth once daily., Disp: , Rfl:      Objective:   Review of Systems   Cardiovascular:  Negative for chest pain, claudication, cyanosis, dyspnea on exertion, irregular heartbeat, leg swelling, near-syncope, orthopnea, palpitations, paroxysmal nocturnal dyspnea and syncope.         Vitals:    05/21/24 1113   BP: (!) 145/91   Pulse: 68     Wt Readings from Last 3 Encounters:   05/21/24 93.6 kg (206 lb 5.6 oz)   04/02/18 92.5 kg (203 lb 14.8 oz)   01/22/18 94 kg (207 lb 3.7 oz)     Temp Readings from Last 3 Encounters:   04/02/18 98.6 °F (37 °C) (Oral)   01/22/18 98.2 °F (36.8 °C) (Oral)   06/27/17 98.2 °F (36.8 °C)     BP Readings from Last 3 Encounters:   05/21/24 (!) 145/91   04/02/18 (!) 140/94   01/22/18 (!) 151/100     Pulse Readings from Last 3 Encounters:   05/21/24 68   04/02/18 88   01/22/18 64             Physical Exam  Vitals reviewed.   Constitutional:       General: He is not in acute distress.     Appearance: He is well-developed.   HENT:      Head: Normocephalic and atraumatic.      Nose: Nose normal.   Eyes:      Conjunctiva/sclera: Conjunctivae normal.      Pupils: Pupils are equal, round, and reactive to light.   Neck:      Vascular: No carotid bruit or JVD.   Cardiovascular:      Rate and Rhythm: Normal rate and regular rhythm.      Pulses: Normal pulses and intact distal pulses.      Heart sounds: Normal heart sounds. No murmur heard.     No friction rub. No gallop.   Pulmonary:      Effort: Pulmonary effort is normal. No respiratory distress.      Breath sounds: Normal breath sounds. No wheezing or rales.   Chest:      Chest wall: No tenderness.   Abdominal:      General: Bowel sounds are normal. There is no  distension.      Palpations: Abdomen is soft.      Tenderness: There is no abdominal tenderness.   Musculoskeletal:         General: No tenderness or deformity. Normal range of motion.      Cervical back: Normal range of motion and neck supple.      Right lower leg: No edema.      Left lower leg: No edema.   Skin:     General: Skin is warm and dry.      Findings: No erythema or rash.   Neurological:      Mental Status: He is alert and oriented to person, place, and time.      Cranial Nerves: No cranial nerve deficit.      Motor: No abnormal muscle tone.      Coordination: Coordination normal.   Psychiatric:         Behavior: Behavior normal.         Thought Content: Thought content normal.         Judgment: Judgment normal.           Lab Results   Component Value Date    CHOL 205 (H) 10/14/2016    CHOL 231 (H) 05/17/2012    CHOL 201 (H) 02/10/2011     Lab Results   Component Value Date    HDL 69 10/14/2016    HDL 88 (H) 05/17/2012    HDL 75 02/10/2011     Lab Results   Component Value Date    LDLCALC 124.2 10/14/2016    LDLCALC 134.0 05/17/2012    LDLCALC 116.0 02/10/2011     Lab Results   Component Value Date     (H) 05/04/2017    AST 99 (H) 05/04/2017     (H) 05/03/2017    AST 26 11/23/2016     Lab Results   Component Value Date    CREATININE 0.9 05/03/2017    BUN 18 05/03/2017     05/03/2017    K 5.0 05/03/2017    CO2 28 05/03/2017    CO2 30 (H) 10/14/2016    CO2 27 05/17/2012     Lab Results   Component Value Date    HGB 16.4 05/04/2017    HCT 49 05/04/2017    HCT 46.1 05/04/2017    HCT 47.9 05/03/2017       Cholesterol, Total - [facility] 204 High    HDL Cholesterol - [facility]  64   Triglycerides - [facility]  60   LDL Cholesterol - [facility]  125 High                        Assessment and Plan:     Agatston coronary artery calcium score between 100 and 199  Comments:  Asymptomatic  Ten year risk remains less than 7.5%  Orders:  -     IN OFFICE EKG 12-LEAD (to [facility])    Aortic root dilatation  Comments:  when  adjusted for height, low risk  Recommend repeat in 1-2 years with contrast    Pure hypercholesterolemia  Comments:  will assess cardio IQ with inflammation in 3 months  No statin therapy at present        Long-term endurance athletes may develop coronary calcification, but still be at low risk for coronary events.  I will obtain an advanced lipid profile in 3 months to assess for high-risk lipid abnormalities, otherwise no statin therapy at this time, continue aspirin.  No follow-ups on file.          No future appointments.

## 2024-05-22 LAB
OHS QRS DURATION: 82 MS
OHS QTC CALCULATION: 421 MS

## 2024-11-06 ENCOUNTER — TELEPHONE (OUTPATIENT)
Dept: CARDIOLOGY | Facility: CLINIC | Age: 59
End: 2024-11-06
Payer: COMMERCIAL

## 2025-06-09 ENCOUNTER — TELEPHONE (OUTPATIENT)
Dept: INTERNAL MEDICINE | Facility: CLINIC | Age: 60
End: 2025-06-09
Payer: COMMERCIAL

## 2025-06-11 ENCOUNTER — OFFICE VISIT (OUTPATIENT)
Dept: INTERNAL MEDICINE | Facility: CLINIC | Age: 60
End: 2025-06-11
Payer: COMMERCIAL

## 2025-06-11 ENCOUNTER — RESULTS FOLLOW-UP (OUTPATIENT)
Dept: INTERNAL MEDICINE | Facility: CLINIC | Age: 60
End: 2025-06-11

## 2025-06-11 ENCOUNTER — LAB VISIT (OUTPATIENT)
Dept: LAB | Facility: HOSPITAL | Age: 60
End: 2025-06-11
Attending: FAMILY MEDICINE
Payer: COMMERCIAL

## 2025-06-11 ENCOUNTER — E-CONSULT (OUTPATIENT)
Facility: CLINIC | Age: 60
End: 2025-06-11
Payer: COMMERCIAL

## 2025-06-11 ENCOUNTER — PATIENT MESSAGE (OUTPATIENT)
Dept: INTERNAL MEDICINE | Facility: CLINIC | Age: 60
End: 2025-06-11

## 2025-06-11 VITALS
HEART RATE: 60 BPM | DIASTOLIC BLOOD PRESSURE: 80 MMHG | HEIGHT: 72 IN | WEIGHT: 218.25 LBS | OXYGEN SATURATION: 96 % | SYSTOLIC BLOOD PRESSURE: 138 MMHG | BODY MASS INDEX: 29.56 KG/M2

## 2025-06-11 DIAGNOSIS — G25.81 RESTLESS LEGS SYNDROME: ICD-10-CM

## 2025-06-11 DIAGNOSIS — D68.51 FACTOR V LEIDEN CARRIER: ICD-10-CM

## 2025-06-11 DIAGNOSIS — G47.00 INSOMNIA, UNSPECIFIED TYPE: ICD-10-CM

## 2025-06-11 DIAGNOSIS — G47.33 OBSTRUCTIVE SLEEP APNEA: ICD-10-CM

## 2025-06-11 DIAGNOSIS — K76.0 FATTY LIVER: ICD-10-CM

## 2025-06-11 DIAGNOSIS — E78.00 PURE HYPERCHOLESTEROLEMIA: ICD-10-CM

## 2025-06-11 DIAGNOSIS — K59.00 CONSTIPATION, UNSPECIFIED CONSTIPATION TYPE: ICD-10-CM

## 2025-06-11 DIAGNOSIS — R73.03 PREDIABETES: ICD-10-CM

## 2025-06-11 DIAGNOSIS — Z87.891 PAST HISTORY OF CHEWING TOBACCO USE: ICD-10-CM

## 2025-06-11 DIAGNOSIS — Z00.00 ENCOUNTER FOR ANNUAL GENERAL MEDICAL EXAMINATION WITHOUT ABNORMAL FINDINGS IN ADULT: Primary | ICD-10-CM

## 2025-06-11 DIAGNOSIS — Z12.5 ENCOUNTER FOR SCREENING FOR MALIGNANT NEOPLASM OF PROSTATE: ICD-10-CM

## 2025-06-11 DIAGNOSIS — Z87.39 HISTORY OF GOUT: ICD-10-CM

## 2025-06-11 DIAGNOSIS — Z12.11 ENCOUNTER FOR SCREENING FOR MALIGNANT NEOPLASM OF COLON: ICD-10-CM

## 2025-06-11 DIAGNOSIS — K76.0 FATTY LIVER: Primary | ICD-10-CM

## 2025-06-11 DIAGNOSIS — R10.9 ABDOMINAL PAIN, UNSPECIFIED ABDOMINAL LOCATION: Primary | ICD-10-CM

## 2025-06-11 DIAGNOSIS — Z00.00 ENCOUNTER FOR ANNUAL GENERAL MEDICAL EXAMINATION WITHOUT ABNORMAL FINDINGS IN ADULT: ICD-10-CM

## 2025-06-11 DIAGNOSIS — Z76.89 ENCOUNTER TO ESTABLISH CARE WITH NEW DOCTOR: ICD-10-CM

## 2025-06-11 DIAGNOSIS — E83.119 HEMOCHROMATOSIS, UNSPECIFIED HEMOCHROMATOSIS TYPE: ICD-10-CM

## 2025-06-11 DIAGNOSIS — Z86.711 PERSONAL HISTORY OF PULMONARY EMBOLISM: ICD-10-CM

## 2025-06-11 DIAGNOSIS — R14.0 BLOATING: Primary | ICD-10-CM

## 2025-06-11 DIAGNOSIS — R03.0 ELEVATED BP WITHOUT DIAGNOSIS OF HYPERTENSION: ICD-10-CM

## 2025-06-11 DIAGNOSIS — G25.0 ESSENTIAL TREMOR: ICD-10-CM

## 2025-06-11 DIAGNOSIS — R10.13 EPIGASTRIC PAIN: ICD-10-CM

## 2025-06-11 PROBLEM — M10.9 ACUTE GOUT INVOLVING TOE OF LEFT FOOT: Status: RESOLVED | Noted: 2017-05-03 | Resolved: 2025-06-11

## 2025-06-11 PROBLEM — R53.83 FATIGUE: Status: RESOLVED | Noted: 2017-05-03 | Resolved: 2025-06-11

## 2025-06-11 PROBLEM — D72.819 LEUKOPENIA: Status: RESOLVED | Noted: 2024-04-26 | Resolved: 2025-06-11

## 2025-06-11 LAB
ABSOLUTE EOSINOPHIL (OHS): 0.15 K/UL
ABSOLUTE MONOCYTE (OHS): 0.79 K/UL (ref 0.3–1)
ABSOLUTE NEUTROPHIL COUNT (OHS): 2.31 K/UL (ref 1.8–7.7)
ALBUMIN SERPL BCP-MCNC: 4.3 G/DL (ref 3.5–5.2)
ALP SERPL-CCNC: 44 UNIT/L (ref 40–150)
ALT SERPL W/O P-5'-P-CCNC: 178 UNIT/L (ref 10–44)
ANION GAP (OHS): 8 MMOL/L (ref 8–16)
AST SERPL-CCNC: 91 UNIT/L (ref 11–45)
BASOPHILS # BLD AUTO: 0.07 K/UL
BASOPHILS NFR BLD AUTO: 1.5 %
BILIRUB SERPL-MCNC: 0.9 MG/DL (ref 0.1–1)
BUN SERPL-MCNC: 17 MG/DL (ref 6–20)
CALCIUM SERPL-MCNC: 9.5 MG/DL (ref 8.7–10.5)
CHLORIDE SERPL-SCNC: 106 MMOL/L (ref 95–110)
CHOLEST SERPL-MCNC: 171 MG/DL (ref 120–199)
CHOLEST/HDLC SERPL: 3.2 {RATIO} (ref 2–5)
CO2 SERPL-SCNC: 27 MMOL/L (ref 23–29)
CREAT SERPL-MCNC: 0.9 MG/DL (ref 0.5–1.4)
EAG (OHS): 111 MG/DL (ref 68–131)
ERYTHROCYTE [DISTWIDTH] IN BLOOD BY AUTOMATED COUNT: 12.3 % (ref 11.5–14.5)
FERRITIN SERPL-MCNC: 380 NG/ML (ref 20–300)
FOLATE SERPL-MCNC: 10 NG/ML (ref 4–24)
GFR SERPLBLD CREATININE-BSD FMLA CKD-EPI: >60 ML/MIN/1.73/M2
GLUCOSE SERPL-MCNC: 102 MG/DL (ref 70–110)
HBA1C MFR BLD: 5.5 % (ref 4–5.6)
HCT VFR BLD AUTO: 47.6 % (ref 40–54)
HDLC SERPL-MCNC: 54 MG/DL (ref 40–75)
HDLC SERPL: 31.6 % (ref 20–50)
HGB BLD-MCNC: 16.2 GM/DL (ref 14–18)
HIV 1+2 AB+HIV1 P24 AG SERPL QL IA: NORMAL
IMM GRANULOCYTES # BLD AUTO: 0.01 K/UL (ref 0–0.04)
IMM GRANULOCYTES NFR BLD AUTO: 0.2 % (ref 0–0.5)
IRON SATN MFR SERPL: 25 % (ref 20–50)
IRON SERPL-MCNC: 107 UG/DL (ref 45–160)
LDLC SERPL CALC-MCNC: 104.8 MG/DL (ref 63–159)
LYMPHOCYTES # BLD AUTO: 1.3 K/UL (ref 1–4.8)
MCH RBC QN AUTO: 33.8 PG (ref 27–31)
MCHC RBC AUTO-ENTMCNC: 34 G/DL (ref 32–36)
MCV RBC AUTO: 99 FL (ref 82–98)
NONHDLC SERPL-MCNC: 117 MG/DL
NUCLEATED RBC (/100WBC) (OHS): 0 /100 WBC
PLATELET # BLD AUTO: 177 K/UL (ref 150–450)
PMV BLD AUTO: 10.2 FL (ref 9.2–12.9)
POTASSIUM SERPL-SCNC: 4.8 MMOL/L (ref 3.5–5.1)
PROT SERPL-MCNC: 7.6 GM/DL (ref 6–8.4)
PSA SERPL-MCNC: 0.5 NG/ML
RBC # BLD AUTO: 4.8 M/UL (ref 4.6–6.2)
RELATIVE EOSINOPHIL (OHS): 3.2 %
RELATIVE LYMPHOCYTE (OHS): 28.1 % (ref 18–48)
RELATIVE MONOCYTE (OHS): 17.1 % (ref 4–15)
RELATIVE NEUTROPHIL (OHS): 49.9 % (ref 38–73)
SODIUM SERPL-SCNC: 141 MMOL/L (ref 136–145)
T4 FREE SERPL-MCNC: 1.04 NG/DL (ref 0.71–1.51)
TIBC SERPL-MCNC: 429 UG/DL (ref 250–450)
TRANSFERRIN SERPL-MCNC: 290 MG/DL (ref 200–375)
TRIGL SERPL-MCNC: 61 MG/DL (ref 30–150)
TSH SERPL-ACNC: 1.09 UIU/ML (ref 0.4–4)
URATE SERPL-MCNC: 6.9 MG/DL (ref 3.4–7)
VIT B12 SERPL-MCNC: 421 PG/ML (ref 210–950)
WBC # BLD AUTO: 4.63 K/UL (ref 3.9–12.7)

## 2025-06-11 PROCEDURE — 84550 ASSAY OF BLOOD/URIC ACID: CPT

## 2025-06-11 PROCEDURE — 84153 ASSAY OF PSA TOTAL: CPT

## 2025-06-11 PROCEDURE — 3075F SYST BP GE 130 - 139MM HG: CPT | Mod: CPTII,S$GLB,, | Performed by: FAMILY MEDICINE

## 2025-06-11 PROCEDURE — 99446 NTRPROF PH1/NTRNET/EHR 5-10: CPT | Mod: S$GLB,,, | Performed by: INTERNAL MEDICINE

## 2025-06-11 PROCEDURE — 3008F BODY MASS INDEX DOCD: CPT | Mod: CPTII,S$GLB,, | Performed by: FAMILY MEDICINE

## 2025-06-11 PROCEDURE — 82746 ASSAY OF FOLIC ACID SERUM: CPT

## 2025-06-11 PROCEDURE — 36415 COLL VENOUS BLD VENIPUNCTURE: CPT

## 2025-06-11 PROCEDURE — 3079F DIAST BP 80-89 MM HG: CPT | Mod: CPTII,S$GLB,, | Performed by: FAMILY MEDICINE

## 2025-06-11 PROCEDURE — 84439 ASSAY OF FREE THYROXINE: CPT

## 2025-06-11 PROCEDURE — 99999 PR PBB SHADOW E&M-EST. PATIENT-LVL III: CPT | Mod: PBBFAC,,, | Performed by: FAMILY MEDICINE

## 2025-06-11 PROCEDURE — 84443 ASSAY THYROID STIM HORMONE: CPT

## 2025-06-11 PROCEDURE — 99386 PREV VISIT NEW AGE 40-64: CPT | Mod: S$GLB,,, | Performed by: FAMILY MEDICINE

## 2025-06-11 PROCEDURE — 82607 VITAMIN B-12: CPT

## 2025-06-11 PROCEDURE — 1159F MED LIST DOCD IN RCRD: CPT | Mod: CPTII,S$GLB,, | Performed by: FAMILY MEDICINE

## 2025-06-11 PROCEDURE — 3044F HG A1C LEVEL LT 7.0%: CPT | Mod: CPTII,S$GLB,, | Performed by: FAMILY MEDICINE

## 2025-06-11 PROCEDURE — 83540 ASSAY OF IRON: CPT

## 2025-06-11 PROCEDURE — 82040 ASSAY OF SERUM ALBUMIN: CPT

## 2025-06-11 PROCEDURE — 87389 HIV-1 AG W/HIV-1&-2 AB AG IA: CPT

## 2025-06-11 PROCEDURE — 80061 LIPID PANEL: CPT

## 2025-06-11 PROCEDURE — 1160F RVW MEDS BY RX/DR IN RCRD: CPT | Mod: CPTII,S$GLB,, | Performed by: FAMILY MEDICINE

## 2025-06-11 PROCEDURE — 82728 ASSAY OF FERRITIN: CPT

## 2025-06-11 PROCEDURE — 83036 HEMOGLOBIN GLYCOSYLATED A1C: CPT

## 2025-06-11 PROCEDURE — 85025 COMPLETE CBC W/AUTO DIFF WBC: CPT

## 2025-06-11 RX ORDER — OMEPRAZOLE 40 MG/1
40 CAPSULE, DELAYED RELEASE ORAL DAILY
Qty: 28 CAPSULE | Refills: 0 | Status: SHIPPED | OUTPATIENT
Start: 2025-06-11 | End: 2025-07-09

## 2025-06-11 RX ORDER — METHOCARBAMOL 500 MG/1
500 TABLET, FILM COATED ORAL NIGHTLY PRN
Qty: 30 TABLET | Refills: 0 | Status: SHIPPED | OUTPATIENT
Start: 2025-06-11

## 2025-06-11 RX ORDER — ROPINIROLE 0.25 MG/1
TABLET, FILM COATED ORAL
Qty: 62 TABLET | Refills: 0 | Status: SHIPPED | OUTPATIENT
Start: 2025-06-11 | End: 2025-07-13

## 2025-06-11 NOTE — PROGRESS NOTES
[facility]  Annual Exam/Wellness Visit    Patient Information  Marcio Becerra  60 y.o. male    PCP  Lorenzo Solorzano MD    Reason for Visit  Establish Care      Subjective:  History of Present Illness    CHIEF COMPLAINT:  Patient presents today to establish care with concerns about bloating and abdominal discomfort.    He reports bloating that began 1.5 months ago, initially responding to Gaviscon antacids. He experienced an episode of constipation with minimal response to light laxatives, producing only small amounts of liquid or toothpaste-like stool. Bowel movements returned to normal this past Monday. He currently reports feeling full despite experiencing hunger sensations.    He reports right-sided rib pain following a fall on his porch during Memorial Day weekend. Pain began a few days after the fall and has migrated downward. Pain is intermittent, occurring primarily when rising from recliner, with no discomfort while walking. He denies constant pain.    He reports difficulty sleeping due to back muscle cramping and restless leg syndrome. He experiences cramping in the muscles on either side of his spine while lying on his back, forcing position changes. Restless leg syndrome occurs approximately every 7-10 days. He previously used hydroxyzine for sleep issues. He reports prior adverse reaction to Gabapentin affecting his mood and behavior. He has discontinued CPAP use for sleep apnea due to mask displacement during sleep.    He exercises daily with cardio, taking only 3 days off during the past 10-15 days. He is a former cigar smoker and smokeless tobacco user who quit 2 years ago. He denies current vaping. He reports history of heavy alcohol use but stopped completely 10-15 days ago, noting a 3-pound weight gain since cessation.    History of Factor V Leiden with pulmonary embolism in 2017, single occurrence of gout in 2017, sleep apnea, and fatty liver confirmed by  biopsy.    History of ACL repair on left knee, contracture release on bilateral hands (right hand followed by recent left hand procedure), bilateral knee meniscus procedures, and vasectomy.    Mother with history of stroke and bone cancer in leg, father with history of bladder cancer.         Health Maintenance         Date Due Completion Date    COVID-19 Vaccine (2 - 2024-25 season) 09/01/2024 4/7/2021    RSV Vaccine (Age 60+ and Pregnant patients) (1 - Risk 60-74 years 1-dose series) 06/11/2025 (Originally 3/12/2025) ---    TETANUS VACCINE 06/11/2026 (Originally 3/12/1983) ---    Shingles Vaccine (1 of 2) 06/11/2026 (Originally 3/12/2015) ---    Pneumococcal Vaccines (Age 50+) (1 of 2 - PCV) 06/11/2026 (Originally 3/12/1984) ---    Influenza Vaccine (Season Ended) 09/01/2025 ---    PROSTATE-SPECIFIC ANTIGEN 06/11/2026 6/11/2025    Hemoglobin A1c (Prediabetes) 06/11/2026 6/11/2025    Colorectal Cancer Screening 11/01/2026 11/1/2016    Lipid Panel 06/11/2030 6/11/2025            Review of Systems   Constitutional:  Positive for unexpected weight change. Negative for chills, fatigue and fever.   HENT:  Negative for nasal congestion, ear pain, postnasal drip and sore throat.    Eyes:  Negative for visual disturbance.   Respiratory:  Negative for cough, shortness of breath and wheezing.    Cardiovascular:  Negative for chest pain and palpitations.   Gastrointestinal:  Positive for abdominal distention, change in bowel habit and constipation. Negative for abdominal pain, diarrhea, nausea and vomiting.   Genitourinary:  Negative for dysuria and hematuria.   Musculoskeletal:  Positive for back pain. Negative for leg pain and neck pain.   Neurological:  Negative for dizziness, numbness and headaches.   Hematological:  Negative for adenopathy.   Psychiatric/Behavioral:  Positive for sleep disturbance. Negative for dysphoric mood and suicidal ideas. The patient is not nervous/anxious.         Patient answers are not  available for this visit.      Problem List and History  Problem List[1]  Past Medical History[2]  Past Surgical History[3]  Social History     Substance and Sexual Activity   Sexual Activity Yes    Partners: Female    Birth control/protection: Partner-Vasectomy     Tobacco Use History[4]  Social History     Substance and Sexual Activity   Alcohol Use Not Currently    Alcohol/week: 15.0 standard drinks of alcohol    Types: 2 Glasses of wine, 10 Cans of beer, 3 Drinks containing 0.5 oz of alcohol per week    Comment: stopped 06/01/2025     Social History     Substance and Sexual Activity   Drug Use No       Medication List  Current Medications[5]    Objective:  Vitals:    06/11/25 0850   BP: 138/80   Pulse: 60   SpO2: 96%   Weight: 99 kg (218 lb 4.1 oz)   Height: 6' (1.829 m)       Physical Exam  Vitals reviewed.   Constitutional:       General: He is not in acute distress.     Appearance: Normal appearance. He is not ill-appearing.   HENT:      Head: Normocephalic and atraumatic.      Right Ear: Tympanic membrane, ear canal and external ear normal. There is no impacted cerumen.      Left Ear: Tympanic membrane, ear canal and external ear normal. There is no impacted cerumen.      Nose: Nose normal. No congestion or rhinorrhea.      Mouth/Throat:      Mouth: Mucous membranes are moist.      Pharynx: Oropharynx is clear. No oropharyngeal exudate or posterior oropharyngeal erythema.   Eyes:      General: No scleral icterus.        Right eye: No discharge.         Left eye: No discharge.      Conjunctiva/sclera: Conjunctivae normal.   Neck:      Thyroid: No thyroid mass, thyromegaly or thyroid tenderness.   Cardiovascular:      Rate and Rhythm: Normal rate and regular rhythm.      Pulses: Normal pulses.      Heart sounds: Normal heart sounds. No murmur heard.     No friction rub. No gallop.   Pulmonary:      Effort: Pulmonary effort is normal. No respiratory distress.      Breath sounds: Normal breath sounds. No  stridor. No wheezing, rhonchi or rales.   Chest:      Chest wall: No tenderness.   Abdominal:      General: Abdomen is flat. Bowel sounds are normal. There is no distension.      Palpations: Abdomen is soft. There is no mass.      Tenderness: There is no abdominal tenderness. There is no guarding. Negative signs include Das's sign and McBurney's sign.      Hernia: No hernia is present.   Musculoskeletal:         General: No swelling or deformity. Normal range of motion.      Cervical back: Normal range of motion and neck supple. No tenderness.   Lymphadenopathy:      Cervical: No cervical adenopathy.   Skin:     General: Skin is warm and dry.   Neurological:      General: No focal deficit present.      Mental Status: He is alert and oriented to person, place, and time. Mental status is at baseline.      Gait: Gait normal.      Deep Tendon Reflexes: Reflexes normal.   Psychiatric:         Mood and Affect: Mood normal.         Behavior: Behavior normal.         Thought Content: Thought content normal.         Judgment: Judgment normal.         Lab Results  CBC  Lab Results   Component Value Date    WBC 4.63 06/11/2025    RBC 4.80 06/11/2025    HGB 16.2 06/11/2025    HCT 47.6 06/11/2025    MCV 99 (H) 06/11/2025    MCH 33.8 (H) 06/11/2025    MCHC 34.0 06/11/2025    RDW 12.3 06/11/2025     06/11/2025    MPV 10.2 06/11/2025    GRAN 3.3 05/04/2017    GRAN 66.2 05/04/2017    LYMPH 28.1 06/11/2025    LYMPH 1.30 06/11/2025    MONO 17.1 (H) 06/11/2025    MONO 0.79 06/11/2025    EOS 3.2 06/11/2025    EOS 0.15 06/11/2025    BASO 0.01 05/04/2017    EOSINOPHIL 2.0 05/04/2017    BASOPHIL 1.5 06/11/2025    BASOPHIL 0.07 06/11/2025       CMP    Chemistry        Component Value Date/Time     06/11/2025 0940     04/25/2024 1135     05/03/2017 1506    K 4.8 06/11/2025 0940    K 4.3 04/25/2024 1135    K 5.0 05/03/2017 1506     06/11/2025 0940     05/03/2017 1506    CO2 27 06/11/2025 0940    CO2 28  04/25/2024 1135    CO2 28 05/03/2017 1506    BUN 17 06/11/2025 0940    CREATININE 0.9 06/11/2025 0940     06/11/2025 0940    GLU 97 04/25/2024 1135    GLU 95 05/03/2017 1506        Component Value Date/Time    CALCIUM 9.5 06/11/2025 0940    CALCIUM 9.7 04/25/2024 1135    CALCIUM 11.0 (H) 05/03/2017 1506    ALKPHOS 44 06/11/2025 0940    ALKPHOS 44 04/25/2024 1135    ALKPHOS 50 (L) 05/04/2017 1752    AST 91 (H) 06/11/2025 0940    AST 94 (H) 04/25/2024 1135    AST 99 (H) 05/04/2017 1752     (H) 06/11/2025 0940     (H) 04/25/2024 1135     (H) 05/04/2017 1752    BILITOT 0.9 06/11/2025 0940    BILITOT 0.7 04/25/2024 1135    BILITOT 1.1 (H) 05/04/2017 1752    ESTGFRAFRICA >60.0 05/03/2017 1506    EGFRNONAA >60.0 05/03/2017 1506            Lipids  Lab Results   Component Value Date    CHOL 171 06/11/2025    CHOL 205 (H) 10/14/2016    CHOL 231 (H) 05/17/2012      Lab Results   Component Value Date    HDL 54 06/11/2025    HDL 69 10/14/2016    HDL 88 (H) 05/17/2012     Lab Results   Component Value Date    LDLCALC 104.8 06/11/2025    LDLCALC 124.2 10/14/2016    LDLCALC 134.0 05/17/2012      Lab Results   Component Value Date    TRIG 61 06/11/2025    TRIG 59 10/14/2016    TRIG 46 05/17/2012     Lab Results   Component Value Date    TOTALCHOLEST 3.2 06/11/2025    TOTALCHOLEST 3.0 10/14/2016    TOTALCHOLEST 2.6 05/17/2012     Lab Results   Component Value Date    NONHDLCHOL 117 06/11/2025    NONHDLCHOL 136 10/14/2016     Lab Results   Component Value Date    CHOLHDL 31.6 06/11/2025    CHOLHDL 33.7 10/14/2016    CHOLHDL 38.1 05/17/2012       Thyroid Function  Lab Results   Component Value Date    TSH 1.095 06/11/2025    FREET4 1.04 06/11/2025         Diabetes Screen  Lab Results   Component Value Date    HGBA1C 5.5 06/11/2025       Other Labs  Prostate Specific Antigen (ng/mL)   Date Value   06/11/2025 0.50     PSA, Screen (ng/mL)   Date Value   10/14/2016 0.76     Lab Results   Component Value Date     [id number] Non-Reactive 06/11/2025         Assessment and Plan:    ICD-10-CM ICD-9-CM   1. Encounter for annual general medical examination without abnormal findings in adult  Z00.00 V70.0   2. Encounter to establish care with new doctor  Z76.89 V65.8   3. Pure hypercholesterolemia  E78.00 272.0   4. Elevated BP without diagnosis of hypertension  R03.0 796.2   5. Factor V Leiden carrier  D68.51 289.81   6. Personal history of pulmonary embolism  Z86.711 V12.55   7. Prediabetes  R73.03 790.29   8. Fatty liver  K76.0 571.8   9. Hemochromatosis, unspecified hemochromatosis type  E83.119 275.03   10. History of gout  Z87.39 V12.29   11. Obstructive sleep apnea  G47.33 327.23   12. Past history of chewing tobacco use  Z87.891 V15.82   13. Essential tremor  G25.0 333.1   14. Restless legs syndrome  G25.81 333.94   15. Constipation, unspecified constipation type  K59.00 564.00   16. Encounter for screening for malignant neoplasm of colon  Z12.11 V76.51   17. Encounter for screening for malignant neoplasm of prostate  Z12.5 V76.44   18. Epigastric pain  R10.13 789.06   19. Insomnia, unspecified type  G47.00 780.52     Orders Placed This Encounter    Comprehensive Metabolic Panel    CBC Auto Differential    Lipid Panel    Hemoglobin A1C    TSH    T4, Free    PSA, Screening    HIV 1/2 Ag/Ab (4th Gen)    Uric Acid    Ferritin    Folate    Vitamin B12    Iron and TIBC    omeprazole (PRILOSEC) 40 MG capsule    methocarbamoL (ROBAXIN) 500 MG Tab    rOPINIRole (REQUIP) 0.25 MG tablet    E-Consult to Gastroenterology       Assessment & Plan    - Evaluated abdominal symptoms, considering potential GI issues.  - Assessed right upper quadrant pain, likely due to bruising from recent fall rather than liver/gallbladder pathology.  - Considered management options for sleep issues, including restless leg syndrome and back discomfort.  - Explained potential connection between iron deficiency and restless leg syndrome.    1. Encounter for  annual general medical examination without abnormal findings in adult (Primary)  Health maintenance updated  Chronic issues reviewed  Fasting labs ordered  - Comprehensive Metabolic Panel; Future  - CBC Auto Differential; Future  - Lipid Panel; Future  - Hemoglobin A1C; Future  - TSH; Future  - T4, Free; Future  - HIV 1/2 Ag/Ab (4th Gen); Future    2. Encounter to establish care with new doctor  Reviewed chronic medical conditions, updated problem list and medication list    3. Pure hypercholesterolemia  F/u FLP  No active rx  - Comprehensive Metabolic Panel; Future  - Lipid Panel; Future    4. Elevated BP without diagnosis of hypertension  Normotensive on exam today, no concerns    5. Factor V Leiden carrier  Chronic, stable  On ASA, continue  Following Heme/Onc thru [facility]    6. Personal history of pulmonary embolism  See above  No active issues    7. Prediabetes  F/u A1c  Focus on risk mitigation  - Hemoglobin A1C; Future    8. Fatty liver  F/u CMP  Encourage weight loss as first line tx  - Comprehensive Metabolic Panel; Future    9. Hemochromatosis, unspecified hemochromatosis type  Stable, chronic  No active issues  F/u CMP  - Comprehensive Metabolic Panel; Future    10. History of gout  No active issues, solitary occurrence  F/u CMP and Uric Acid; if normal will resolve issue and add to hx  - Comprehensive Metabolic Panel; Future  - Uric Acid; Future    11. Obstructive sleep apnea  Chronic  Unable to tolerate CPAP currently  Work on weight loss as alternative means to manage    12. Past history of chewing tobacco use  Noted in hx, now ceased  Will need AAA screen later in life    13. Essential tremor  Inactive, no issues  Will resolve and add to hx    14. Restless legs syndrome  Chronic, not controlled  Rare but disruptive  F/u iron studies and vitamin studies; treat as indicated  Start low dose requip and assess efficacy in 4 weeks  - Ferritin; Future  - Folate; Future  - Vitamin B12; Future  - Iron and TIBC;  Future  - rOPINIRole (REQUIP) 0.25 MG tablet; Take 1 tablet (0.25 mg total) by mouth every evening for 2 days, THEN 2 tablets (0.5 mg total) every evening.  Dispense: 62 tablet; Refill: 0    15. Constipation, unspecified constipation type  Resolved, monitor for recurrence    16. Encounter for screening for malignant neoplasm of colon  Last Colonoscopy completed on 11/1/2016, next due 2026    17. Encounter for screening for malignant neoplasm of prostate  - PSA, Screening; Future    18. Epigastric pain  No red flags on exam  Suspect gas pressure +/- acid reflux  With duration and persistence of sx, pt may benefit from EGD  E-Consult to GI to request EGD  Rx omeprazole 40mg qd x28d  - E-Consult to Gastroenterology  - omeprazole (PRILOSEC) 40 MG capsule; Take 1 capsule (40 mg total) by mouth once daily.  Dispense: 28 capsule; Refill: 0    19. Insomnia, unspecified type  Discussed options to aid with sleep  Will use requip to assist w/ RLS  Also use robaxin for muscle relaxation to address lower back complaints  - methocarbamoL (ROBAXIN) 500 MG Tab; Take 1 tablet (500 mg total) by mouth nightly as needed (back pain/spasms).  Dispense: 30 tablet; Refill: 0  - rOPINIRole (REQUIP) 0.25 MG tablet; Take 1 tablet (0.25 mg total) by mouth every evening for 2 days, THEN 2 tablets (0.5 mg total) every evening.  Dispense: 62 tablet; Refill: 0      Check-Out:  Follow-Up  Follow up in about 1 year (around 6/11/2026) for Annual Visit.    Upcoming Appointments  No future appointments.      Lorenzo Solorzano MD, [facility]  Family Medicine Physician  [facility]  6/11/2025      This note was generated with the assistance of ambient listening technology. Verbal consent was obtained by the patient and accompanying visitor(s) for the recording of patient appointment to facilitate this note. I attest to having reviewed and edited the generated note for accuracy, though some syntax or spelling errors may persist.  Please contact the author of this note for any clarification.                                   [1]   Patient Active Problem List  Diagnosis    Deep vein thrombosis (DVT) of proximal vein of right lower extremity, March 2017    Fatigue    Acute gout involving toe of left foot    Factor V Leiden carrier    Agatston coronary artery calcium score between 100 and 199    Aortic root dilatation    Carpal tunnel syndrome on right    Cervical herniated disc    Cervical radiculopathy    Dupuytren's contracture of hand    Elevated BP without diagnosis of hypertension    Essential tremor    Fatty liver    Hemochromatosis    Hyperlipidemia, unspecified    History of gout    Leukopenia    Long term (current) use of aspirin    Obstructive sleep apnea    Other specified arthritis, left hand    Past history of chewing tobacco use    Personal history of pulmonary embolism    Prediabetes    Restless legs syndrome    Spondylosis of cervical region without myelopathy or radiculopathy   [2]   Past Medical History:  Diagnosis Date    DJD (degenerative joint disease) of cervical spine     hx of     Fatty liver     Fever blister     Gout 05/2017    left big toe   [3]   Past Surgical History:  Procedure Laterality Date    acl repair left knee      acl replacement left knee       COLONOSCOPY N/A 11/01/2016    Procedure: COLONOSCOPY;  Surgeon: YFN Godoy MD;  Location: [facility] ([address] [place] [address]);  Service: Endoscopy;  Laterality: N/A;    FINGER CONTRACTURE RELEASE Right 2016    FINGER CONTRACTURE RELEASE Right 01/2025    meniscal tear bilateral      VASECTOMY  1999   [4]   Social History  Tobacco Use   Smoking Status Former    Types: Cigars   Smokeless Tobacco Former    Quit date: 2023   [5]   Current Outpatient Medications:     clobetasoL (TEMOVATE) 0.05 % external solution, To scalp every day as as needed, Disp: 50 mL, Rfl: 11    ketoconazole (NIZORAL) 2 % shampoo, Shampoo twice weekly to rash., Disp: 120 mL, Rfl: 12    aspirin (ECOTRIN) 81  MG EC tablet, Take 2 tablets by mouth once daily., Disp: , Rfl:     methocarbamoL (ROBAXIN) 500 MG Tab, Take 1 tablet (500 mg total) by mouth nightly as needed (back pain/spasms)., Disp: 30 tablet, Rfl: 0    omeprazole (PRILOSEC) 40 MG capsule, Take 1 capsule (40 mg total) by mouth once daily., Disp: 28 capsule, Rfl: 0    rOPINIRole (REQUIP) 0.25 MG tablet, Take 1 tablet (0.25 mg total) by mouth every evening for 2 days, THEN 2 tablets (0.5 mg total) every evening., Disp: 62 tablet, Rfl: 0

## 2025-06-11 NOTE — CONSULTS
[facility] - [facility]  Response for E-Consult     Patient Name: Marcio Becerra  MRN: [id number]  Primary Care Provider: Lorenzo Solorzano MD   Requesting Provider: Lorenzo Solorzano MD  E-Consult to Gastroenterology  Consult performed by: Montrell Arteaga MD  Consult ordered by: Lorenzo Solorzano MD          Recommendation: EGD    Additional future steps to consider:      An Ambulatory Referral for an Endoscopy Order will be pended to the referring provider with the following:    Procedure: EGD    Diagnosis: Abdominal pain    Procedure Timing: Within 12 weeks        Total time of Consultation: 10 minute    I did not speak to the requesting provider verbally about this.     *This eConsult is based on the clinical data available to me and is furnished without benefit of a physical examination. The eConsult will need to be interpreted in light of any clinical issues or changes in patient status not available to me at the time of filing this eConsults. Significant changes in patient condition or level of acuity should result in immediate formal consultation and reevaluation. Please alert me if you have further questions.    Thank you for this eConsult referral.     MD [facility] - [facility]astroenter[facility]

## 2025-06-18 ENCOUNTER — PATIENT MESSAGE (OUTPATIENT)
Dept: INTERNAL MEDICINE | Facility: CLINIC | Age: 60
End: 2025-06-18
Payer: COMMERCIAL

## 2025-06-18 DIAGNOSIS — R10.9 ABDOMINAL PAIN, UNSPECIFIED ABDOMINAL LOCATION: ICD-10-CM

## 2025-06-18 DIAGNOSIS — K76.0 FATTY LIVER: Primary | ICD-10-CM

## 2025-06-18 NOTE — TELEPHONE ENCOUNTER
"In 6/11 results follow up message, provider informed pt of lab results and stated the following  "Your liver function tests are elevated, similar to how they have appeared as far as back your lab records go. This may be from hemochromatosis. It may also be from fatty liver disease. I have ordered a FibroScan, a type of ultrasound, to look into this further to see if there is anything else we need to do."    Pt appears to also be following up on 6/11 e-consult with Gastrology- note states "Recommendation: EGD  Additional future steps to consider:    An Ambulatory Referral for an Endoscopy Order will be pended to the referring provider with the following:  Procedure: EGD"    Pt seeking orders for EGD    LOV with Lorenzo Solorzano MD , 6/11/2025    "

## 2025-06-19 ENCOUNTER — TELEPHONE (OUTPATIENT)
Dept: ENDOSCOPY | Facility: HOSPITAL | Age: 60
End: 2025-06-19
Payer: COMMERCIAL

## 2025-06-19 NOTE — TELEPHONE ENCOUNTER
Contacted the patient to schedule an endoscopy procedure(s) Upper Endoscopy (EGD) . Spoke with patient. Patient states he is driving right now and will call back to schedule. Phone number provided.

## 2025-06-20 ENCOUNTER — TELEPHONE (OUTPATIENT)
Dept: ENDOSCOPY | Facility: HOSPITAL | Age: 60
End: 2025-06-20
Payer: COMMERCIAL

## 2025-06-20 VITALS — HEIGHT: 72 IN | BODY MASS INDEX: 29.12 KG/M2 | WEIGHT: 215 LBS

## 2025-06-20 DIAGNOSIS — R10.9 ABDOMINAL PAIN, UNSPECIFIED ABDOMINAL LOCATION: Primary | ICD-10-CM

## 2025-06-20 DIAGNOSIS — K76.0 FATTY LIVER: ICD-10-CM

## 2025-06-20 NOTE — TELEPHONE ENCOUNTER
Patient is scheduled for a Upper Endoscopy (EGD) on 6/27/25 with Dr. SANDRA Whitaker  Referral for procedure from  [facility] referral (see Appts tab)

## 2025-06-24 ENCOUNTER — ANESTHESIA EVENT (OUTPATIENT)
Dept: ENDOSCOPY | Facility: HOSPITAL | Age: 60
End: 2025-06-24
Payer: COMMERCIAL

## 2025-06-24 NOTE — ANESTHESIA PREPROCEDURE EVALUATION
06/24/2025  Marcio Becerra is a 60 y.o., male.  [facility]  Anesthesia Pre-Operative Evaluation       Patient Name: Marcio Becerra  YOB: 1965  MRN: [id number]  [facility]: [id number]      Code Status: No Order   Date of Procedure: 6/27/2025  Anesthesia: Choice Procedure: Procedure(s) (LRB):  EGD (ESOPHAGOGASTRODUODENOSCOPY) (N/A)  Pre-Operative Diagnosis: Abdominal pain, unspecified abdominal location [R10.9]  Fatty liver [K76.0]  Proceduralist: Surgeons and Role:     * Juan Whitaker MD - Primary        SUBJECTIVE:   Marcio Becerra is a 60 y.o. male who  has a past medical history of Acute gout involving toe of left foot (05/03/2017), DJD (degenerative joint disease) of cervical spine, Essential tremor (04/24/2024), Fatty liver, Fever blister, Gout (05/2017), History of gout (04/26/2024), and Leukopenia (04/26/2024)..     he has a current medication list which includes the following long-term medication(s): aspirin, clobetasol, ketoconazole, omeprazole, and ropinirole.     ALLERGIES:   Review of patient's allergies indicates:  No Known Allergies  LDA:          Lines/Drains/Airways       Peripheral Intravenous Line  Duration                  Peripheral IV - Single Lumen 05/04/17 1751 Left Antecubital 2972 days         Peripheral IV - Single Lumen 06/26/17 1150 Right Antecubital 2920 days                   Anesthesia Evaluation      Airway   Mallampati: II  TM distance: Normal  Dental      Pulmonary    (+) sleep apnea  Cardiovascular   (+) CAD    Rate: Normal    Neuro/Psych    (+) neuromuscular disease    GI/Hepatic/Renal    (+) liver disease    Endo/Other    Abdominal                   MEDICATIONS:     Antibiotics (From admission, onward)      None          VTE Risk Mitigation (From admission, onward)      None              Current Medications[1]       History:    There are no hospital problems to display for this patient.    Surgical History:    has a past surgical history that includes acl repair left knee; meniscal tear bilateral; acl replacement left knee ; Colonoscopy (N/A, 11/01/2016); Finger contracture release (Right, 2016); Vasectomy (1999); and Finger contracture release (Right, 01/2025).   Social History:    reports being sexually active and has had partner(s) who are female. He reports using the following method of birth control/protection: Partner-Vasectomy.  reports that he has quit smoking. His smoking use included cigars. He quit smokeless tobacco use about 2 years ago. He reports that he does not currently use alcohol after a past usage of about 15.0 standard drinks of alcohol per week. He reports that he does not use drugs.     OBJECTIVE:     Vital Signs (Most Recent):    Vital Signs Range (Last 24H):          There is no height or weight on file to calculate BMI.   Wt Readings from Last 4 Encounters:   06/20/25 97.5 kg (215 lb)   06/11/25 99 kg (218 lb 4.1 oz)   05/21/24 93.6 kg (206 lb 5.6 oz)   04/02/18 92.5 kg (203 lb 14.8 oz)       Significant Labs:  Lab Results   Component Value Date    WBC 4.63 06/11/2025    HGB 16.2 06/11/2025    HCT 47.6 06/11/2025     06/11/2025     06/11/2025    K 4.8 06/11/2025     06/11/2025    CREATININE 0.9 06/11/2025    BUN 17 06/11/2025    CO2 27 06/11/2025     06/11/2025    CALCIUM 9.5 06/11/2025    ALKPHOS 44 06/11/2025     (H) 06/11/2025    AST 91 (H) 06/11/2025    ALBUMIN 4.3 06/11/2025    INR 0.9 10/08/2007    APTT 23.0 10/08/2007    HGBA1C 5.5 06/11/2025    CPK 78 07/06/2006    TROPONINI <0.006 05/04/2017    BNP 15 05/04/2017     No LMP for male patient.  No results found for this or any previous visit (from the past 72 hours).    EKG:   Results for orders placed or performed in visit on 05/21/24   IN OFFICE EKG 12-LEAD (to [place])    Collection Time: 05/21/24 11:19 AM   Result Value  "Ref Range    QRS Duration 82 ms    [facility] QTC Calculation 421 ms    Narrative    Test Reason : R93.1,    Vent. Rate : 068 BPM     Atrial Rate : 068 BPM     P-R Int : 176 ms          QRS Dur : 082 ms      QT Int : 396 ms       P-R-T Axes : 065 069 048 degrees     QTc Int : 421 ms    Normal sinus rhythm  Normal ECG  When compared with ECG of 04-MAY-2017 15:12,  Premature atrial complexes are no longer Present  Confirmed by Ghanshyam Foote JR., MD ([id number]) on 5/22/2024 7:52:17 AM    Referred By:             Confirmed By:Ghanshyam Foote       TTE:  No results found for this or any previous visit.  No results found for: "EF"   No results found for this or any previous visit.  XAVIER:  No results found for this or any previous visit.  Stress Test:  No results found for this or any previous visit.     LHC:  No results found for this or any previous visit.     PFT:  No results found for: "FEV1", "FVC", "VOQ3FUQ", "TLC", "DLCO"     ASSESSMENT/PLAN:         Pre-op Assessment    I have reviewed the Patient Summary Reports.    I have reviewed the NPO Status.   I have reviewed the Medications.     Review of Systems  Anesthesia Hx:  No problems with previous Anesthesia                Social:  Former Smoker, No Alcohol Use       Cardiovascular:        CAD                                          Pulmonary:        Sleep Apnea                Hepatic/GI:      Liver Disease,               Neurological:    Neuromuscular Disease,                                       Physical Exam  General: Well nourished    Airway:  Mallampati: II   Mouth Opening: Normal  TM Distance: Normal    Chest/Lungs:  Normal Respiratory Rate    Heart:  Rate: Normal    Anesthesia Plan  Type of Anesthesia, risks & benefits discussed:    Anesthesia Type: Gen Natural Airway  Intra-op Monitoring Plan: Standard ASA Monitors  Induction:  IV  Informed Consent: Informed consent signed with the Patient and all parties understand the risks and agree with anesthesia plan.  " All questions answered. Patient consented to blood products? No  ASA Score: 3  Day of Surgery Review of History & Physical: H&P Update referred to the surgeon/provider.    Ready For Surgery From Anesthesia Perspective.     .             [1]  No current facility-administered medications for this encounter.     Current Outpatient Medications   Medication Sig Dispense Refill    aspirin (ECOTRIN) 81 MG EC tablet Take 2 tablets by mouth once daily.      clobetasoL (TEMOVATE) 0.05 % external solution To scalp every day as as needed 50 mL 11    ketoconazole (NIZORAL) 2 % shampoo Shampoo twice weekly to rash. 120 mL 12    methocarbamoL (ROBAXIN) 500 MG Tab Take 1 tablet (500 mg total) by mouth nightly as needed (back pain/spasms). 30 tablet 0    omeprazole (PRILOSEC) 40 MG capsule Take 1 capsule (40 mg total) by mouth once daily. 28 capsule 0    rOPINIRole (REQUIP) 0.25 MG tablet Take 1 tablet (0.25 mg total) by mouth every evening for 2 days, THEN 2 tablets (0.5 mg total) every evening. 62 tablet 0

## 2025-06-26 ENCOUNTER — TELEPHONE (OUTPATIENT)
Dept: ENDOSCOPY | Facility: HOSPITAL | Age: 60
End: 2025-06-26
Payer: COMMERCIAL

## 2025-06-27 ENCOUNTER — ANESTHESIA (OUTPATIENT)
Dept: ENDOSCOPY | Facility: HOSPITAL | Age: 60
End: 2025-06-27
Payer: COMMERCIAL

## 2025-06-29 ENCOUNTER — NURSE TRIAGE (OUTPATIENT)
Dept: ADMINISTRATIVE | Facility: CLINIC | Age: 60
End: 2025-06-29
Payer: COMMERCIAL

## 2025-06-29 NOTE — TELEPHONE ENCOUNTER
[place]    PCP:  Lorenzo Solorzano MD    Pt is going to a birthday party today.  He is wanting to know if he can drink a couple of beers when he is scheduled for EGD tomorrow afternoon.  Advised Pt to err on the side of caution and don't drink d/t increased risk of bleeding during his procedure.  Pt VU.  Advised to call for new symptoms/questions/concerns.  VU.  Message routed to GI.  Information only.    Reason for Disposition   Question about upcoming scheduled surgery, procedure or test, no triage required, and triager able to answer question    Additional Information   Negative: Triager needs further essential information from caller in order to complete triage   Negative: [1] Caller requesting NON-URGENT health information AND [2] PCP's office is the best resource   Negative: Requesting regular office appointment   Negative: Health information question, no triage required and triager able to answer question   Negative: General information question, no triage required and triager able to answer question    Protocols used: Information Only Call - No Triage-A-

## 2025-06-30 ENCOUNTER — HOSPITAL ENCOUNTER (OUTPATIENT)
Facility: HOSPITAL | Age: 60
Discharge: HOME | End: 2025-06-30
Attending: INTERNAL MEDICINE | Admitting: INTERNAL MEDICINE
Payer: COMMERCIAL

## 2025-06-30 VITALS
HEART RATE: 76 BPM | HEIGHT: 72 IN | TEMPERATURE: 99 F | DIASTOLIC BLOOD PRESSURE: 80 MMHG | SYSTOLIC BLOOD PRESSURE: 128 MMHG | OXYGEN SATURATION: 96 % | BODY MASS INDEX: 29.12 KG/M2 | WEIGHT: 215 LBS | RESPIRATION RATE: 17 BRPM

## 2025-06-30 DIAGNOSIS — K76.0 FATTY LIVER: ICD-10-CM

## 2025-06-30 DIAGNOSIS — R10.9 ABDOMINAL PAIN, UNSPECIFIED ABDOMINAL LOCATION: ICD-10-CM

## 2025-06-30 DIAGNOSIS — R10.9 ABDOMINAL PAIN: ICD-10-CM

## 2025-06-30 PROCEDURE — 99900035 HC TECH TIME PER 15 MIN (STAT)

## 2025-06-30 PROCEDURE — 43239 EGD BIOPSY SINGLE/MULTIPLE: CPT | Mod: ,,, | Performed by: INTERNAL MEDICINE

## 2025-06-30 PROCEDURE — 27201012 HC FORCEPS, HOT/COLD, DISP: Performed by: INTERNAL MEDICINE

## 2025-06-30 PROCEDURE — 25000003 PHARM REV CODE 250: Performed by: NURSE ANESTHETIST, CERTIFIED REGISTERED

## 2025-06-30 PROCEDURE — 37000009 HC ANESTHESIA EA ADD 15 MINS: Performed by: INTERNAL MEDICINE

## 2025-06-30 PROCEDURE — 25000003 PHARM REV CODE 250: Performed by: INTERNAL MEDICINE

## 2025-06-30 PROCEDURE — 43239 EGD BIOPSY SINGLE/MULTIPLE: CPT | Performed by: INTERNAL MEDICINE

## 2025-06-30 PROCEDURE — 63600175 PHARM REV CODE 636 W HCPCS: Mod: JZ,TB | Performed by: NURSE ANESTHETIST, CERTIFIED REGISTERED

## 2025-06-30 PROCEDURE — 94761 N-INVAS EAR/PLS OXIMETRY MLT: CPT

## 2025-06-30 PROCEDURE — 88342 IMHCHEM/IMCYTCHM 1ST ANTB: CPT | Mod: TC | Performed by: INTERNAL MEDICINE

## 2025-06-30 PROCEDURE — 37000008 HC ANESTHESIA 1ST 15 MINUTES: Performed by: INTERNAL MEDICINE

## 2025-06-30 RX ORDER — PROPOFOL 10 MG/ML
VIAL (ML) INTRAVENOUS
Status: DISCONTINUED | OUTPATIENT
Start: 2025-06-30 | End: 2025-06-30

## 2025-06-30 RX ORDER — SODIUM CHLORIDE 9 MG/ML
INJECTION, SOLUTION INTRAVENOUS CONTINUOUS
Status: DISCONTINUED | OUTPATIENT
Start: 2025-06-30 | End: 2025-06-30 | Stop reason: HOSPADM

## 2025-06-30 RX ORDER — FENTANYL CITRATE 50 UG/ML
INJECTION, SOLUTION INTRAMUSCULAR; INTRAVENOUS
Status: DISCONTINUED | OUTPATIENT
Start: 2025-06-30 | End: 2025-06-30

## 2025-06-30 RX ORDER — DEXMEDETOMIDINE HYDROCHLORIDE 100 UG/ML
INJECTION, SOLUTION INTRAVENOUS
Status: DISCONTINUED | OUTPATIENT
Start: 2025-06-30 | End: 2025-06-30

## 2025-06-30 RX ORDER — MIDAZOLAM HYDROCHLORIDE 1 MG/ML
INJECTION INTRAMUSCULAR; INTRAVENOUS
Status: DISCONTINUED | OUTPATIENT
Start: 2025-06-30 | End: 2025-06-30

## 2025-06-30 RX ORDER — LIDOCAINE HYDROCHLORIDE 20 MG/ML
INJECTION INTRAVENOUS
Status: DISCONTINUED | OUTPATIENT
Start: 2025-06-30 | End: 2025-06-30

## 2025-06-30 RX ORDER — GLYCOPYRROLATE 0.2 MG/ML
INJECTION INTRAMUSCULAR; INTRAVENOUS
Status: DISCONTINUED | OUTPATIENT
Start: 2025-06-30 | End: 2025-06-30

## 2025-06-30 RX ADMIN — PROPOFOL 50 MG: 10 INJECTION, EMULSION INTRAVENOUS at 01:06

## 2025-06-30 RX ADMIN — DEXMEDETOMIDINE HYDROCHLORIDE 12 MCG: 100 INJECTION, SOLUTION INTRAVENOUS at 12:06

## 2025-06-30 RX ADMIN — LIDOCAINE HYDROCHLORIDE 100 MG: 20 INJECTION INTRAVENOUS at 01:06

## 2025-06-30 RX ADMIN — MIDAZOLAM HYDROCHLORIDE 2 MG: 1 INJECTION, SOLUTION INTRAMUSCULAR; INTRAVENOUS at 01:06

## 2025-06-30 RX ADMIN — SODIUM CHLORIDE: 0.9 INJECTION, SOLUTION INTRAVENOUS at 12:06

## 2025-06-30 RX ADMIN — FENTANYL CITRATE 25 MCG: 50 INJECTION, SOLUTION INTRAMUSCULAR; INTRAVENOUS at 12:06

## 2025-06-30 RX ADMIN — PROPOFOL 100 MG: 10 INJECTION, EMULSION INTRAVENOUS at 01:06

## 2025-06-30 RX ADMIN — GLYCOPYRROLATE 0.2 MG: 0.2 INJECTION, SOLUTION INTRAMUSCULAR; INTRAVENOUS at 12:06

## 2025-06-30 NOTE — TRANSFER OF CARE
Anesthesia Transfer of Care Note    Patient: Marcio Becerra    Procedure(s) Performed: Procedure(s) (LRB):  EGD (ESOPHAGOGASTRODUODENOSCOPY) (N/A)    Patient location: PACU    Anesthesia Type: general    Transport from OR: Transported from OR on room air with adequate spontaneous ventilation    Post pain: adequate analgesia    Post assessment: no apparent anesthetic complications and tolerated procedure well    Post vital signs: stable    Level of consciousness: awake, alert and oriented    Nausea/Vomiting: no nausea/vomiting    Complications: none    Transfer of care protocol was followed      Last vitals: Visit Vitals  BP (!) 170/101 (BP Location: Left arm, Patient Position: Lying)   Pulse 61   Temp 37 °C (98.6 °F) (Temporal)   Resp 16   Ht 6' (1.829 m)   Wt 97.5 kg (215 lb)   SpO2 99%   BMI 29.16 kg/m²

## 2025-06-30 NOTE — H&P
Short Stay Endoscopy History and Physical    PCP - Lorenzo Solorzano MD    Procedure - EGD    HPI:  This is a 60 y.o. male here for evaluation of abdominal pain and bloating.    ROS:  Constitutional: No fevers, chills, No weight loss  ENT: No allergies  CV: No chest pain  Pulm: No shortness of breath  GI: see HPI  Derm: No rash    Medical History:  has a past medical history of Acute gout involving toe of left foot (05/03/2017), DJD (degenerative joint disease) of cervical spine, Essential tremor (04/24/2024), Fatty liver, Fever blister, Gout (05/2017), History of gout (04/26/2024), and Leukopenia (04/26/2024).    Surgical History:  has a past surgical history that includes acl repair left knee; meniscal tear bilateral; acl replacement left knee ; Colonoscopy (N/A, 11/01/2016); Finger contracture release (Right, 2016); Vasectomy (1999); and Finger contracture release (Right, 01/2025).    Family History: family history includes Asthma in his brother; Atrial fibrillation in his mother; Cancer in his father and mother; Drug abuse in his brother and sister; Glaucoma in his brother; Hearing loss in his father; Heart disease in his brother; Hyperlipidemia in his brother; Hypertension in his father; Stroke in his mother.. Otherwise no colon cancer, inflammatory bowel disease, or GI malignancies.    Social History:  reports that he has quit smoking. His smoking use included cigars. He quit smokeless tobacco use about 2 years ago. He reports that he does not currently use alcohol after a past usage of about 15.0 standard drinks of alcohol per week. He reports that he does not use drugs.    Review of patient's allergies indicates:  No Known Allergies    Medications:   Prescriptions Prior to Admission[1]      Objective Findings:    Vital Signs: see nursing notes  Physical Exam:  General Appearance: Well appearing in no acute distress  Eyes:    No scleral icterus  ENT: Neck supple  Lungs: CTA anteriorly  Heart:  S1, S2 normal, no  murmurs heard  Abdomen: Soft, non tender, non distended with positive bowel sounds. No hepatosplenomegaly, ascites, or mass  Extremities: no edema  Skin: No rash      Labs:  Lab Results   Component Value Date    WBC 4.63 06/11/2025    HGB 16.2 06/11/2025    HCT 47.6 06/11/2025     06/11/2025    CHOL 171 06/11/2025    TRIG 61 06/11/2025    HDL 54 06/11/2025     (H) 06/11/2025    AST 91 (H) 06/11/2025     06/11/2025    K 4.8 06/11/2025     06/11/2025    CREATININE 0.9 06/11/2025    BUN 17 06/11/2025    CO2 27 06/11/2025    TSH 1.095 06/11/2025    PSA 0.50 06/11/2025    INR 0.9 10/08/2007    HGBA1C 5.5 06/11/2025       I have explained the risks and benefits of endoscopy procedures to the patient including but not limited to bleeding, perforation, infection, and death.    Chen Krishna MD       [1]   Medications Prior to Admission   Medication Sig Dispense Refill Last Dose/Taking    aspirin (ECOTRIN) 81 MG EC tablet Take 2 tablets by mouth once daily.       clobetasoL (TEMOVATE) 0.05 % external solution To scalp every day as as needed 50 mL 11     ketoconazole (NIZORAL) 2 % shampoo Shampoo twice weekly to rash. 120 mL 12     methocarbamoL (ROBAXIN) 500 MG Tab Take 1 tablet (500 mg total) by mouth nightly as needed (back pain/spasms). 30 tablet 0     omeprazole (PRILOSEC) 40 MG capsule Take 1 capsule (40 mg total) by mouth once daily. 28 capsule 0     rOPINIRole (REQUIP) 0.25 MG tablet Take 1 tablet (0.25 mg total) by mouth every evening for 2 days, THEN 2 tablets (0.5 mg total) every evening. 62 tablet 0

## 2025-06-30 NOTE — [employer] PATIENT INSTRUCTIONS
Discharge Summary/Instructions after an Endoscopic Procedure  Patient Name: Marcio Becerra  Patient MRN: [id number]  Patient YOB: 1965  Monday, June 30, 2025  Chen Krishna MD  Dear patient,  As a result of recent federal legislation (The Federal Cures Act), you may   receive lab or pathology results from your procedure in your [facility]   account before your physician is able to contact you. Your physician or   their representative will relay the results to you with their   recommendations at their soonest availability.  Thank you,  RESTRICTIONS:  During your procedure today, you received medications for sedation.  These   medications may affect your judgment, balance and coordination.  Therefore,   for 24 hours, you have the following restrictions:   - DO NOT drive a car, operate machinery, make legal/financial decisions,   sign important papers or drink alcohol.    ACTIVITY:  Today: no heavy lifting, straining or running due to procedural   sedation/anesthesia.  The following day: return to full activity including work.  DIET:  Eat and drink normally unless instructed otherwise.     TREATMENT FOR COMMON SIDE EFFECTS:  - Mild abdominal pain, nausea, belching, bloating or excessive gas:  rest,   eat lightly and use a heating pad.  - Sore Throat: treat with throat lozenges and/or gargle with warm salt   water.  - Because air was used during the procedure, expelling large amounts of air   from your rectum or belching is normal.  - If a bowel prep was taken, you may not have a bowel movement for 1-3 days.    This is normal.  SYMPTOMS TO WATCH FOR AND REPORT TO YOUR PHYSICIAN:  1. Abdominal pain or bloating, other than gas cramps.  2. Chest pain.  3. Back pain.  4. Signs of infection such as: chills or fever occurring within 24 hours   after the procedure.  5. Rectal bleeding, which would show as bright red, maroon, or black stools.   (A tablespoon of blood from the rectum is not serious, especially if    hemorrhoids are present.)  6. Vomiting.  7. Weakness or dizziness.  GO DIRECTLY TO THE NEAREST EMERGENCY ROOM IF YOU HAVE ANY OF THE FOLLOWING:      Difficulty breathing              Chills and/or fever over 101 F   Persistent vomiting and/or vomiting blood   Severe abdominal pain   Severe chest pain   Black, tarry stools   Bleeding- more than one tablespoon   Any other symptom or condition that you feel may need urgent attention  Your doctor recommends these additional instructions:  If any biopsies were taken, your doctors clinic will contact you in 1 to 2   weeks with any results.  - Await pathology results.   - Discharge patient to home.   - Resume previous diet.   - Continue present medications.   For questions, problems or results please call your physician - Chen Krishna MD at Work:  [phone].  [facility] EMERGENCY ROOM PHONE NUMBER: [phone]  IF A COMPLICATION OR EMERGENCY SITUATION ARISES AND YOU ARE UNABLE TO REACH   YOUR PHYSICIAN - GO DIRECTLY TO THE EMERGENCY ROOM.  MD Chen Read MD  6/30/2025 1:11:13 PM  This report has been verified and signed electronically.  Dear patient,  As a result of recent federal legislation (The Federal Cures Act), you may   receive lab or pathology results from your procedure in your [facility]   account before your physician is able to contact you. Your physician or   their representative will relay the results to you with their   recommendations at their soonest availability.  Thank you,  [employer]

## 2025-06-30 NOTE — PLAN OF CARE
Pt arrived to Endoscopy recovery area via stretcher per GI team. Bedside report received. Pt attached to bedside monitor. VSS. Pt AAOx4___ post procedure. Pt on room air oxygen; sats 96____%. Pt IV access infusing NS. Will continue to monitor.

## 2025-07-04 LAB
ESTRIOL SERPL-MCNC: NORMAL NG/ML
ESTROGEN SERPL-MCNC: NORMAL PG/ML
INSULIN SERPL-ACNC: NORMAL U[IU]/ML
LAB AP CLINICAL INFORMATION: NORMAL
LAB AP GROSS DESCRIPTION: NORMAL
LAB AP PERFORMING LOCATION(S): NORMAL
LAB AP REPORT FOOTNOTES: NORMAL

## 2025-07-07 ENCOUNTER — RESULTS FOLLOW-UP (OUTPATIENT)
Dept: GASTROENTEROLOGY | Facility: HOSPITAL | Age: 60
End: 2025-07-07

## 2025-07-18 ENCOUNTER — PATIENT MESSAGE (OUTPATIENT)
Dept: INTERNAL MEDICINE | Facility: CLINIC | Age: 60
End: 2025-07-18
Payer: COMMERCIAL

## 2025-07-23 ENCOUNTER — PATIENT MESSAGE (OUTPATIENT)
Dept: INTERNAL MEDICINE | Facility: CLINIC | Age: 60
End: 2025-07-23
Payer: COMMERCIAL

## 2025-07-23 DIAGNOSIS — G25.81 RESTLESS LEGS SYNDROME: ICD-10-CM

## 2025-07-23 DIAGNOSIS — R10.13 EPIGASTRIC PAIN: ICD-10-CM

## 2025-07-23 DIAGNOSIS — G47.00 INSOMNIA, UNSPECIFIED TYPE: ICD-10-CM

## 2025-07-23 RX ORDER — ROPINIROLE 0.25 MG/1
TABLET, FILM COATED ORAL
Qty: 62 TABLET | Refills: 0 | Status: CANCELLED | OUTPATIENT
Start: 2025-07-23 | End: 2025-08-24

## 2025-07-23 RX ORDER — OMEPRAZOLE 40 MG/1
40 CAPSULE, DELAYED RELEASE ORAL DAILY
Qty: 28 CAPSULE | Refills: 0 | Status: CANCELLED | OUTPATIENT
Start: 2025-07-23 | End: 2025-08-20

## 2025-07-23 NOTE — TELEPHONE ENCOUNTER
No care due was identified.  [facility] Embedded Care Due Messages. Reference number: [id number].   7/23/2025 4:23:52 PM CDT

## 2025-07-24 ENCOUNTER — PATIENT MESSAGE (OUTPATIENT)
Dept: OPTOMETRY | Facility: CLINIC | Age: 60
End: 2025-07-24
Payer: COMMERCIAL

## 2025-07-24 NOTE — TELEPHONE ENCOUNTER
Refill Routing Note   Medication(s) are not appropriate for processing by [facility] for the following reason(s):        New or recently adjusted medication    [facility] action(s):  Defer             Appointments  past 12m or future 3m with PCP    Date Provider   Last Visit   6/11/2025 Lorenzo Solorzano MD   Next Visit   Visit date not found Lorenzo Solorazno MD   ED visits in past 90 days: 0        Note composed:9:31 PM 07/23/2025

## 2025-07-25 DIAGNOSIS — R10.13 EPIGASTRIC PAIN: ICD-10-CM

## 2025-07-25 DIAGNOSIS — G25.81 RESTLESS LEGS SYNDROME: ICD-10-CM

## 2025-07-25 DIAGNOSIS — G47.00 INSOMNIA, UNSPECIFIED TYPE: ICD-10-CM

## 2025-07-25 RX ORDER — OMEPRAZOLE 40 MG/1
40 CAPSULE, DELAYED RELEASE ORAL DAILY
Qty: 28 CAPSULE | Refills: 0 | Status: CANCELLED | OUTPATIENT
Start: 2025-07-23 | End: 2025-08-20

## 2025-07-25 NOTE — TELEPHONE ENCOUNTER
No care due was identified.  [facility] Embedded Care Due Messages. Reference number: [id number].   7/25/2025 10:56:47 AM CDT

## 2025-07-26 NOTE — TELEPHONE ENCOUNTER
Refill Routing Note   Medication(s) are not appropriate for processing by [facility] for the following reason(s):        New or recently adjusted medication    [facility] action(s):  Defer             Appointments  past 12m or future 3m with PCP    Date Provider   Last Visit   6/11/2025 Lorenzo Solorzano MD   Next Visit   Visit date not found Lorenzo Solorzano MD   ED visits in past 90 days: 0        Note composed:5:15 PM 07/26/2025

## 2025-07-28 DIAGNOSIS — R10.13 EPIGASTRIC PAIN: ICD-10-CM

## 2025-07-28 RX ORDER — OMEPRAZOLE 40 MG/1
40 CAPSULE, DELAYED RELEASE ORAL DAILY
Qty: 28 CAPSULE | Refills: 3 | Status: SHIPPED | OUTPATIENT
Start: 2025-07-28 | End: 2025-11-17

## 2025-07-28 RX ORDER — ROPINIROLE 0.5 MG/1
0.5 TABLET, FILM COATED ORAL NIGHTLY
Qty: 90 TABLET | Refills: 1 | Status: SHIPPED | OUTPATIENT
Start: 2025-07-28 | End: 2026-01-24

## 2025-07-28 NOTE — TELEPHONE ENCOUNTER
No care due was identified.  [facility] Embedded Care Due Messages. Reference number: [id number].   7/28/2025 4:00:52 PM CDT

## 2025-07-29 ENCOUNTER — PROCEDURE VISIT (OUTPATIENT)
Dept: HEPATOLOGY | Facility: CLINIC | Age: 60
End: 2025-07-29
Payer: COMMERCIAL

## 2025-07-29 ENCOUNTER — PATIENT MESSAGE (OUTPATIENT)
Dept: INTERNAL MEDICINE | Facility: CLINIC | Age: 60
End: 2025-07-29
Payer: COMMERCIAL

## 2025-07-29 DIAGNOSIS — K76.0 FATTY LIVER: ICD-10-CM

## 2025-07-29 PROCEDURE — 91200 LIVER ELASTOGRAPHY: CPT | Mod: S$GLB,,, | Performed by: NURSE PRACTITIONER

## 2025-07-30 NOTE — TELEPHONE ENCOUNTER
LOV-6/11/25-Lorenzo Solorzano MD     Pt states:I am still having pain in my rib cage - upper right. Is this just an injury that takes a long time to heal or should I do something different?

## 2025-07-31 NOTE — PROCEDURES
FibroScan (Vibration Controlled Transient Elastography)    Date/Time: 7/29/2025 9:15 AM    Performed by: Becca Kaur NP  Authorized by: Lorenzo Solorzano MD    Probe:  XL    Universal Protocol: Patient's identity, procedure and site were verified, confirmatory pause was performed.  Discussed procedure including risks and potential complications.  Questions answered.  Patient verbalizes understanding and wishes to proceed with VCTE.     Procedure: After providing explanations of the procedure, patient was placed in the supine position with right arm in maximum abduction to allow optimal exposure of right lateral abdomen.  Patient was briefly assessed, Testing was performed in the mid-axillary location, 50Hz Shear Wave pulses were applied and the resulting Shear Wave and Propagation Speed detected with a 3.5 MHz ultrasonic signal, using the FibroScan probe, Skin to liver capsule distance and liver parenchyma were accessed during the entire examination with the FibroScan probe, Patient was instructed to breathe normally and to abstain from sudden movements during the procedure, allowing for random measurements of liver stiffness. At least 10 Shear Waves were produced, Individual measurements of each Shear Wave were calculated.  Patient tolerated the procedure well with no complications.  Meets discharge criteria as was dismissed.  Rates pain 0 out of 10.  Patient will follow up with ordering provider to review results.    Findings  Median liver stiffness score:  5.8  CAP Reading: dB/m:  343    IQR/med %:  3  Interpretation  Fibrosis interpretation is based on medial liver stiffness - Kilopascal (kPa).    Fibrosis Stage:  F 0-1  Steatosis interpretation is based on controlled attenuation parameter - (dB/m).    Steatosis Grade:  S3  Comments/Plan:  Fibroscan notes steatosis, no fibrosis. Recommend hepatology referral given elevated liver enzymes

## 2025-08-11 ENCOUNTER — OFFICE VISIT (OUTPATIENT)
Dept: INTERNAL MEDICINE | Facility: CLINIC | Age: 60
End: 2025-08-11
Payer: COMMERCIAL

## 2025-08-11 ENCOUNTER — HOSPITAL ENCOUNTER (OUTPATIENT)
Dept: RADIOLOGY | Facility: HOSPITAL | Age: 60
Discharge: HOME | End: 2025-08-11
Attending: FAMILY MEDICINE
Payer: COMMERCIAL

## 2025-08-11 VITALS
SYSTOLIC BLOOD PRESSURE: 122 MMHG | DIASTOLIC BLOOD PRESSURE: 82 MMHG | WEIGHT: 218.94 LBS | OXYGEN SATURATION: 95 % | BODY MASS INDEX: 29.65 KG/M2 | HEART RATE: 63 BPM | HEIGHT: 72 IN

## 2025-08-11 DIAGNOSIS — G47.33 OSA ON CPAP: Primary | ICD-10-CM

## 2025-08-11 DIAGNOSIS — R07.81 RIB PAIN ON RIGHT SIDE: ICD-10-CM

## 2025-08-11 PROCEDURE — 3008F BODY MASS INDEX DOCD: CPT | Mod: CPTII,S$GLB,, | Performed by: FAMILY MEDICINE

## 2025-08-11 PROCEDURE — 71100 X-RAY EXAM RIBS UNI 2 VIEWS: CPT | Mod: 26,RT,, | Performed by: RADIOLOGY

## 2025-08-11 PROCEDURE — 71100 X-RAY EXAM RIBS UNI 2 VIEWS: CPT | Mod: TC,RT

## 2025-08-11 PROCEDURE — 99214 OFFICE O/P EST MOD 30 MIN: CPT | Mod: S$GLB,,, | Performed by: FAMILY MEDICINE

## 2025-08-11 PROCEDURE — 3074F SYST BP LT 130 MM HG: CPT | Mod: CPTII,S$GLB,, | Performed by: FAMILY MEDICINE

## 2025-08-11 PROCEDURE — 3044F HG A1C LEVEL LT 7.0%: CPT | Mod: CPTII,S$GLB,, | Performed by: FAMILY MEDICINE

## 2025-08-11 PROCEDURE — 1160F RVW MEDS BY RX/DR IN RCRD: CPT | Mod: CPTII,S$GLB,, | Performed by: FAMILY MEDICINE

## 2025-08-11 PROCEDURE — 3079F DIAST BP 80-89 MM HG: CPT | Mod: CPTII,S$GLB,, | Performed by: FAMILY MEDICINE

## 2025-08-11 PROCEDURE — 99999 PR PBB SHADOW E&M-EST. PATIENT-LVL IV: CPT | Mod: PBBFAC,,, | Performed by: FAMILY MEDICINE

## 2025-08-11 PROCEDURE — 1159F MED LIST DOCD IN RCRD: CPT | Mod: CPTII,S$GLB,, | Performed by: FAMILY MEDICINE

## 2025-08-11 RX ORDER — TIRZEPATIDE 5 MG/.5ML
5 INJECTION, SOLUTION SUBCUTANEOUS
Qty: 2 ML | Refills: 0 | Status: SHIPPED | OUTPATIENT
Start: 2025-09-09 | End: 2025-10-07

## 2025-08-11 RX ORDER — TIRZEPATIDE 12.5 MG/.5ML
12.5 INJECTION, SOLUTION SUBCUTANEOUS
Qty: 2 ML | Refills: 0 | Status: SHIPPED | OUTPATIENT
Start: 2025-12-05 | End: 2026-01-02

## 2025-08-11 RX ORDER — TIRZEPATIDE 2.5 MG/.5ML
2.5 INJECTION, SOLUTION SUBCUTANEOUS
Qty: 2 ML | Refills: 0 | Status: SHIPPED | OUTPATIENT
Start: 2025-08-11 | End: 2025-09-08

## 2025-08-11 RX ORDER — TIRZEPATIDE 15 MG/.5ML
15 INJECTION, SOLUTION SUBCUTANEOUS
Qty: 2 ML | Refills: 0 | Status: SHIPPED | OUTPATIENT
Start: 2026-01-03 | End: 2026-01-31

## 2025-08-11 RX ORDER — TIRZEPATIDE 7.5 MG/.5ML
7.5 INJECTION, SOLUTION SUBCUTANEOUS
Qty: 2 ML | Refills: 0 | Status: SHIPPED | OUTPATIENT
Start: 2025-10-08 | End: 2025-11-05

## 2025-08-11 RX ORDER — TIRZEPATIDE 10 MG/.5ML
10 INJECTION, SOLUTION SUBCUTANEOUS
Qty: 2 ML | Refills: 0 | Status: SHIPPED | OUTPATIENT
Start: 2025-11-06 | End: 2025-12-04

## 2025-08-13 ENCOUNTER — HOSPITAL ENCOUNTER (OUTPATIENT)
Dept: RADIOLOGY | Facility: HOSPITAL | Age: 60
Discharge: HOME | End: 2025-08-13
Attending: FAMILY MEDICINE
Payer: COMMERCIAL

## 2025-08-13 DIAGNOSIS — R07.81 RIB PAIN ON RIGHT SIDE: ICD-10-CM

## 2025-08-13 PROCEDURE — 74176 CT ABD & PELVIS W/O CONTRAST: CPT | Mod: 26,,, | Performed by: RADIOLOGY

## 2025-08-13 PROCEDURE — 71250 CT THORAX DX C-: CPT | Mod: 26,,, | Performed by: RADIOLOGY

## 2025-08-13 PROCEDURE — 71250 CT THORAX DX C-: CPT | Mod: TC

## 2025-08-21 ENCOUNTER — PATIENT MESSAGE (OUTPATIENT)
Dept: INTERNAL MEDICINE | Facility: CLINIC | Age: 60
End: 2025-08-21
Payer: COMMERCIAL

## 2025-08-23 ENCOUNTER — PATIENT MESSAGE (OUTPATIENT)
Dept: INTERNAL MEDICINE | Facility: CLINIC | Age: 60
End: 2025-08-23
Payer: COMMERCIAL

## 2025-08-27 ENCOUNTER — PATIENT MESSAGE (OUTPATIENT)
Dept: INTERNAL MEDICINE | Facility: CLINIC | Age: 60
End: 2025-08-27
Payer: COMMERCIAL

## 2025-09-02 ENCOUNTER — PATIENT MESSAGE (OUTPATIENT)
Dept: INTERNAL MEDICINE | Facility: CLINIC | Age: 60
End: 2025-09-02
Payer: COMMERCIAL

## 2025-09-05 ENCOUNTER — OFFICE VISIT (OUTPATIENT)
Dept: INTERNAL MEDICINE | Facility: CLINIC | Age: 60
End: 2025-09-05
Payer: COMMERCIAL

## 2025-09-05 RX ORDER — SEMAGLUTIDE 1 MG/.5ML
1 INJECTION, SOLUTION SUBCUTANEOUS
Qty: 2 ML | Refills: 0 | Status: ACTIVE | OUTPATIENT
Start: 2025-09-05

## 2025-09-05 RX ORDER — SEMAGLUTIDE 0.25 MG/.5ML
0.25 INJECTION, SOLUTION SUBCUTANEOUS
Qty: 2 ML | Refills: 0 | Status: ACTIVE | OUTPATIENT
Start: 2025-09-05

## 2025-09-05 RX ORDER — TIRZEPATIDE 2.5 MG/.5ML
2.5 INJECTION, SOLUTION SUBCUTANEOUS
Qty: 2 ML | Refills: 0 | Status: SHIPPED | OUTPATIENT
Start: 2025-09-05 | End: 2025-09-05 | Stop reason: ALTCHOICE

## 2025-09-05 RX ORDER — SEMAGLUTIDE 0.5 MG/.5ML
0.5 INJECTION, SOLUTION SUBCUTANEOUS
Qty: 2 ML | Refills: 0 | Status: ACTIVE | OUTPATIENT
Start: 2025-09-05

## 2025-09-05 RX ORDER — TIRZEPATIDE 5 MG/.5ML
5 INJECTION, SOLUTION SUBCUTANEOUS
Qty: 2 ML | Refills: 2 | Status: SHIPPED | OUTPATIENT
Start: 2025-09-05 | End: 2025-09-05 | Stop reason: ALTCHOICE